# Patient Record
Sex: FEMALE | Race: BLACK OR AFRICAN AMERICAN
[De-identification: names, ages, dates, MRNs, and addresses within clinical notes are randomized per-mention and may not be internally consistent; named-entity substitution may affect disease eponyms.]

---

## 2018-10-16 ENCOUNTER — APPOINTMENT (OUTPATIENT)
Dept: OBGYN | Facility: CLINIC | Age: 38
End: 2018-10-16
Payer: MEDICAID

## 2018-10-16 ENCOUNTER — APPOINTMENT (OUTPATIENT)
Dept: OBGYN | Facility: CLINIC | Age: 38
End: 2018-10-16

## 2018-10-16 PROBLEM — Z00.00 ENCOUNTER FOR PREVENTIVE HEALTH EXAMINATION: Status: ACTIVE | Noted: 2018-10-16

## 2018-10-16 PROCEDURE — 99205 OFFICE O/P NEW HI 60 MIN: CPT | Mod: 25,TH

## 2018-10-16 PROCEDURE — 76817 TRANSVAGINAL US OBSTETRIC: CPT

## 2018-10-30 ENCOUNTER — APPOINTMENT (OUTPATIENT)
Dept: OBGYN | Facility: CLINIC | Age: 38
End: 2018-10-30
Payer: MEDICAID

## 2018-10-30 PROCEDURE — 76817 TRANSVAGINAL US OBSTETRIC: CPT

## 2018-11-13 ENCOUNTER — APPOINTMENT (OUTPATIENT)
Dept: ANTEPARTUM | Facility: CLINIC | Age: 38
End: 2018-11-13

## 2018-11-13 ENCOUNTER — OUTPATIENT (OUTPATIENT)
Dept: OUTPATIENT SERVICES | Facility: HOSPITAL | Age: 38
LOS: 1 days | Discharge: HOME | End: 2018-11-13

## 2018-11-13 ENCOUNTER — APPOINTMENT (OUTPATIENT)
Dept: OBGYN | Facility: CLINIC | Age: 38
End: 2018-11-13
Payer: MEDICAID

## 2018-11-13 ENCOUNTER — RESULT CHARGE (OUTPATIENT)
Age: 38
End: 2018-11-13

## 2018-11-13 VITALS
TEMPERATURE: 98.4 F | SYSTOLIC BLOOD PRESSURE: 122 MMHG | HEIGHT: 70 IN | WEIGHT: 270.19 LBS | DIASTOLIC BLOOD PRESSURE: 74 MMHG | HEART RATE: 83 BPM | BODY MASS INDEX: 38.68 KG/M2 | OXYGEN SATURATION: 100 %

## 2018-11-13 DIAGNOSIS — Z78.9 OTHER SPECIFIED HEALTH STATUS: ICD-10-CM

## 2018-11-13 DIAGNOSIS — Z34.90 ENCOUNTER FOR SUPERVISION OF NORMAL PREGNANCY, UNSPECIFIED, UNSPECIFIED TRIMESTER: ICD-10-CM

## 2018-11-13 DIAGNOSIS — Z80.42 FAMILY HISTORY OF MALIGNANT NEOPLASM OF PROSTATE: ICD-10-CM

## 2018-11-13 LAB
BILIRUB UR QL STRIP: NEGATIVE
CLARITY UR: CLEAR
COLLECTION METHOD: NORMAL
FETAL HEART RATE (BPM): 161
GLUCOSE UR-MCNC: NEGATIVE
HCG UR QL: 0.2 EU/DL
HGB UR QL STRIP.AUTO: NEGATIVE
KETONES UR-MCNC: NEGATIVE
LEUKOCYTE ESTERASE UR QL STRIP: NEGATIVE
NITRITE UR QL STRIP: NEGATIVE
OB COMMENTS: NORMAL
PH UR STRIP: 5
PROT UR STRIP-MCNC: NORMAL
SCHEDULED VISIT: YES
SP GR UR STRIP: 1.02
URINE ALBUMIN/PROTEIN: NORMAL
URINE GLUCOSE: NEGATIVE
URINE KETONES: NEGATIVE
WEEKS GESTATION: NORMAL

## 2018-11-13 PROCEDURE — 99214 OFFICE O/P EST MOD 30 MIN: CPT | Mod: TH

## 2018-11-20 ENCOUNTER — APPOINTMENT (OUTPATIENT)
Dept: ANTEPARTUM | Facility: CLINIC | Age: 38
End: 2018-11-20

## 2018-11-20 ENCOUNTER — OUTPATIENT (OUTPATIENT)
Dept: OUTPATIENT SERVICES | Facility: HOSPITAL | Age: 38
LOS: 1 days | Discharge: HOME | End: 2018-11-20

## 2018-12-09 ENCOUNTER — EMERGENCY (EMERGENCY)
Facility: HOSPITAL | Age: 38
LOS: 0 days | Discharge: HOME | End: 2018-12-09
Attending: EMERGENCY MEDICINE | Admitting: EMERGENCY MEDICINE

## 2018-12-09 VITALS
OXYGEN SATURATION: 100 % | HEART RATE: 89 BPM | RESPIRATION RATE: 18 BRPM | TEMPERATURE: 99 F | DIASTOLIC BLOOD PRESSURE: 63 MMHG | SYSTOLIC BLOOD PRESSURE: 117 MMHG

## 2018-12-09 VITALS — HEIGHT: 70 IN | WEIGHT: 229.94 LBS

## 2018-12-09 DIAGNOSIS — O09.522 SUPERVISION OF ELDERLY MULTIGRAVIDA, SECOND TRIMESTER: ICD-10-CM

## 2018-12-09 DIAGNOSIS — R51 HEADACHE: ICD-10-CM

## 2018-12-09 DIAGNOSIS — Z3A.15 15 WEEKS GESTATION OF PREGNANCY: ICD-10-CM

## 2018-12-09 RX ORDER — METOCLOPRAMIDE HCL 10 MG
10 TABLET ORAL ONCE
Qty: 0 | Refills: 0 | Status: COMPLETED | OUTPATIENT
Start: 2018-12-09 | End: 2018-12-09

## 2018-12-09 RX ORDER — ACETAMINOPHEN 500 MG
975 TABLET ORAL ONCE
Qty: 0 | Refills: 0 | Status: COMPLETED | OUTPATIENT
Start: 2018-12-09 | End: 2018-12-09

## 2018-12-09 RX ORDER — METOCLOPRAMIDE HCL 10 MG
1 TABLET ORAL
Qty: 9 | Refills: 0
Start: 2018-12-09 | End: 2018-12-11

## 2018-12-09 RX ADMIN — Medication 975 MILLIGRAM(S): at 16:03

## 2018-12-09 RX ADMIN — Medication 10 MILLIGRAM(S): at 16:03

## 2018-12-09 NOTE — ED PROVIDER NOTE - NSFOLLOWUPINSTRUCTIONS_ED_ALL_ED_FT
Follow up with your primary care doctor in 48 hours for re-evaluation and further treatment.  Follow up with ob/gyn in 48 hours for re-evaluation and further treatment.    Headache    A headache is pain or discomfort felt around the head or neck area. The specific cause of a headache may not be found as there are many types including tension headaches, migraine headaches, and cluster headaches. Watch your condition for any changes. Things you can do to manage your pain include taking over the counter and prescription medications as instructed by your health care provider, lying down in a dark quiet room, limiting stress, getting regular sleep, and refraining from alcohol and tobacco products.    SEEK IMMEDIATE MEDICAL CARE IF YOU EXPERIENCE THE FOLLOWING SYMPTOMS: fever, vomiting, stiff neck, loss of vision, problems with speech, muscle weakness, loss of balance, trouble walking, pass out, or confusion.

## 2018-12-09 NOTE — ED PROVIDER NOTE - PHYSICAL EXAMINATION
GEN: Well appearing, in no apparent distress.    HEAD:  Normocephalic, atraumatic.    EYES:  Clear conjunctivae without injection, drainage or discharge. EOM intact.     ENMT:  Moist MM.    NECK:  Supple, no masses. Normal ROM.    CARDIAC:  RRR, normal S1 and S2, no murmurs, rubs or gallops.    RESP:  Respiratory rate and effort appear normal; lungs are clear to auscultation bilaterally; no rhonchi, rales or wheezes.    ABDOMEN:  Soft, non-tender, non-distended, no masses. Normal BS throughout.    MUSCULOSKELETAL: No leg swelling, no calf tenderness. Patient able to ambulate without difficulty.  NEURO:  AAO x 3. Motor 5/5. Sensory intact. CN II – XII intact.     SKIN:  Normal skin color for age and race, well-perfused; warm and dry.

## 2018-12-09 NOTE — ED PROVIDER NOTE - CARE PROVIDERS DIRECT ADDRESSES
[email or web address]

## 2018-12-09 NOTE — ED ADULT NURSE NOTE - NSIMPLEMENTINTERV_GEN_ALL_ED
Implemented All Universal Safety Interventions:  [place] to call system. Call bell, personal items and telephone within reach. Instruct patient to call for assistance. Room bathroom lighting operational. Non-slip footwear when patient is off stretcher. Physically safe environment: no spills, clutter or unnecessary equipment. Stretcher in lowest position, wheels locked, appropriate side rails in place.

## 2018-12-09 NOTE — ED PROVIDER NOTE - CARE PROVIDER_API CALL
Sergey Sol), Obstetrics and Gynecology  [facility] [place] [address]  Phone: [phone]  Fax: [phone]

## 2018-12-09 NOTE — ED PROVIDER NOTE - ATTENDING CONTRIBUTION TO CARE
This is a 38yF  at 15wks presents for R temporal headache. She has had similar headaches since the start of this pregnancy, no vomiting, no visual changes, no fevers or neck stiffness.  Headache was similar to usual but recurrent (started 3d ago, lasted for 1d, improved 2d ago then restarted yesterday and has not resolved) and now lasting >24hr which is not typical for her.  Still able to tolerate PO.  Took tylenol without relief and wasn't sure what else she could take in the setting of pregnancy.  No VB, LOF, abd pain/cramping.  Has had US to confirm IUP thus far in her pregnancy.    VSS  CONSTITUTIONAL: well developed; well nourished; well appearing in no acute distress  HEAD: normocephalic; atraumatic  EYES: PERRL, no conjunctival injection, no scleral icterus  ENT: no nasal discharge; airway clear.  NECK: supple; non tender. + full passive ROM in all directions  CARD: S1, S2 normal; no murmurs, gallops, or rubs. Regular rate and rhythm  RESP: no wheezes, rales or rhonchi. Good air movement bilaterally without significant accessory muscle use  ABD: soft; non-distended; non-tender. No rebound, no guarding, no pulsatile abdominal mass  EXT: moving all extremities spontaneously, normal ROM. No clubbing, cyanosis or edema  SKIN: warm and dry, no lesions noted  NEURO: alert, oriented, CN II-XII grossly intact, motor and sensory grossly intact, speech nonslurred, no focal deficits. GCS 15  PSYCH: calm, cooperative, appropriate, good eye contact, logical thought process, no apparent danger to self or others    likely tension headache  minimal clinical concern for acute serious pathology, such as SAH, sinus thrombosis  too early in pregnancy to concern for preeclampsia and her BP is in normal range  will treat and reassess  anticipate d/c home with ob f/u

## 2018-12-09 NOTE — ED ADULT NURSE NOTE - CHPI ED NUR SYMPTOMS NEG
no blurred vision/no vomiting/no fever/no confusion/no change in level of consciousness/no loss of consciousness/no nausea/no numbness

## 2018-12-09 NOTE — ED PROVIDER NOTE - MEDICAL DECISION MAKING DETAILS
38yF  p/w R temporal headache. Well appearing, comfortable, nonfocal neuro exam.  No concern for emergent causes of headache, including SAH, meningitis, sinus thrombosis, preeclampsia. Pain improved w/ reglan and tylenol, ok for dc home with o/p f/u and return precautions.

## 2018-12-09 NOTE — ED PROVIDER NOTE - OBJECTIVE STATEMENT
37 yo female , 15 weeks pregnant presents for headache. Patient states she had tried tylenol without relief. Pain is band-like and like similar pain in the past. Patient denies fevers, chest pain, SOB, abdominal pain, nausea, vomiting, diarrhea, constipation, dizziness, weakness, changes in PO intake, changes in urine output, changes in mental status.

## 2018-12-09 NOTE — ED PROVIDER NOTE - NS ED ROS FT
Constitutional: No fevers/chills.    Head: No injury, (+) headache.    Eyes:  No visual changes, eye pain or discharge. No injury.    ENMT:  No hearing changes, pain, discharge or infections. No neck pain or stiffness. No loss ROM.    Cardiac:  No chest pain, SOB or edema. No chest pain with exertion.    Respiratory:  No cough or respiratory distress.     GI:  No nausea, vomiting, diarrhea or abdominal pain. No anorexia. No change in PO intake.    :  No frequency, urgency or burning. No change in urine output.    MS:  No leg pain, leg swelling, myalgia, muscle weakness, joint pain or back pain. No loss ROM.    Neuro:  No dizziness or weakness.  No LOC.    Skin:  No skin rash.

## 2018-12-13 ENCOUNTER — APPOINTMENT (OUTPATIENT)
Dept: OBGYN | Facility: CLINIC | Age: 38
End: 2018-12-13
Payer: MEDICAID

## 2018-12-13 PROCEDURE — 99214 OFFICE O/P EST MOD 30 MIN: CPT | Mod: TH

## 2019-01-14 ENCOUNTER — APPOINTMENT (OUTPATIENT)
Dept: OBGYN | Facility: CLINIC | Age: 39
End: 2019-01-14
Payer: MEDICAID

## 2019-01-14 PROCEDURE — 99214 OFFICE O/P EST MOD 30 MIN: CPT | Mod: TH

## 2019-01-16 ENCOUNTER — OUTPATIENT (OUTPATIENT)
Dept: OUTPATIENT SERVICES | Facility: HOSPITAL | Age: 39
LOS: 1 days | Discharge: HOME | End: 2019-01-16

## 2019-01-16 ENCOUNTER — APPOINTMENT (OUTPATIENT)
Dept: ANTEPARTUM | Facility: CLINIC | Age: 39
End: 2019-01-16

## 2019-01-18 DIAGNOSIS — O09.522 SUPERVISION OF ELDERLY MULTIGRAVIDA, SECOND TRIMESTER: ICD-10-CM

## 2019-01-18 DIAGNOSIS — Z3A.20 20 WEEKS GESTATION OF PREGNANCY: ICD-10-CM

## 2019-01-18 DIAGNOSIS — O26.872 CERVICAL SHORTENING, SECOND TRIMESTER: ICD-10-CM

## 2019-01-18 DIAGNOSIS — O35.8XX1 MATERNAL CARE FOR OTHER (SUSPECTED) FETAL ABNORMALITY AND DAMAGE, FETUS 1: ICD-10-CM

## 2019-02-01 ENCOUNTER — OUTPATIENT (OUTPATIENT)
Dept: OUTPATIENT SERVICES | Facility: HOSPITAL | Age: 39
LOS: 1 days | End: 2019-02-01
Payer: MEDICAID

## 2019-02-01 PROCEDURE — G9001: CPT

## 2019-02-12 ENCOUNTER — APPOINTMENT (OUTPATIENT)
Dept: OBGYN | Facility: CLINIC | Age: 39
End: 2019-02-12

## 2019-02-14 ENCOUNTER — APPOINTMENT (OUTPATIENT)
Dept: OBGYN | Facility: CLINIC | Age: 39
End: 2019-02-14
Payer: MEDICAID

## 2019-02-14 PROCEDURE — 99214 OFFICE O/P EST MOD 30 MIN: CPT | Mod: TH

## 2019-02-22 DIAGNOSIS — Z71.89 OTHER SPECIFIED COUNSELING: ICD-10-CM

## 2019-03-14 ENCOUNTER — APPOINTMENT (OUTPATIENT)
Dept: OBGYN | Facility: CLINIC | Age: 39
End: 2019-03-14
Payer: MEDICAID

## 2019-03-14 PROCEDURE — 99214 OFFICE O/P EST MOD 30 MIN: CPT | Mod: TH

## 2019-03-19 ENCOUNTER — APPOINTMENT (OUTPATIENT)
Dept: ANTEPARTUM | Facility: CLINIC | Age: 39
End: 2019-03-19

## 2019-03-19 ENCOUNTER — OUTPATIENT (OUTPATIENT)
Dept: OUTPATIENT SERVICES | Facility: HOSPITAL | Age: 39
LOS: 1 days | Discharge: HOME | End: 2019-03-19

## 2019-03-19 ENCOUNTER — RESULT CHARGE (OUTPATIENT)
Age: 39
End: 2019-03-19

## 2019-03-19 VITALS
HEIGHT: 69 IN | WEIGHT: 281 LBS | SYSTOLIC BLOOD PRESSURE: 111 MMHG | DIASTOLIC BLOOD PRESSURE: 64 MMHG | HEART RATE: 81 BPM | OXYGEN SATURATION: 99 % | BODY MASS INDEX: 41.62 KG/M2 | TEMPERATURE: 97.6 F

## 2019-03-19 DIAGNOSIS — O16.9 UNSPECIFIED MATERNAL HYPERTENSION, UNSPECIFIED TRIMESTER: ICD-10-CM

## 2019-03-19 DIAGNOSIS — Z82.49 FAMILY HISTORY OF ISCHEMIC HEART DISEASE AND OTHER DISEASES OF THE CIRCULATORY SYSTEM: ICD-10-CM

## 2019-03-19 LAB
BILIRUB UR QL STRIP: NEGATIVE
CLARITY UR: CLEAR
COLLECTION METHOD: NORMAL
FETAL HEART DESCRIPTION: NORMAL
FETAL HEART RATE (BPM): 141
FETAL MOVEMENT: PRESENT
GLUCOSE UR-MCNC: NEGATIVE
HCG UR QL: 0.2 EU/DL
HGB UR QL STRIP.AUTO: NEGATIVE
KETONES UR-MCNC: NEGATIVE
LEUKOCYTE ESTERASE UR QL STRIP: NORMAL
NITRITE UR QL STRIP: NEGATIVE
PH UR STRIP: 6
PROT UR STRIP-MCNC: NEGATIVE
SP GR UR STRIP: 1.02
URINE ALBUMIN/PROTEIN: NEGATIVE
URINE GLUCOSE: NEGATIVE
URINE KETONES: NEGATIVE

## 2019-03-19 RX ORDER — PNV/FERROUS SULFATE/FOLIC ACID 27-<0.5MG
TABLET ORAL
Refills: 0 | Status: ACTIVE | COMMUNITY

## 2019-03-19 NOTE — END OF VISIT
[FreeTextEntry3] : [facility] Staff\par \par I saw and evaluated Ms. Molina with Dr. Hercules and I agree with the documentation above.  Ms. Molina is a 39yo  at 29w2d GA, BAMBI 18 by LMP, referred by Dr. Sol for elevated blood pressures in his office.  Ms. Molina has no symptoms of headaches, spots in front of her eyes, or RUQ pain.  At today's visit the blood pressure was normal (111/54).\par \par We dsicussed the concerns with preeclampsia.  Preeclampsia can be associated with serious maternal and/or fetal sequelae (eg, abruptio placentae; liver hematoma or rupture; liver failure; heart failure; disseminated intravascular coagulation; stroke; need for mechanical ventilation, invasive hemodynamic monitoring, transfusion, or dialysis).  Fetal risks include but are not limited to stillbirth, IUGR, and  delivery. \par \par However at this point I do not think that Ms. Molina has preeclampsia.  She will monitor her blood pressures at home, collect a 24 hour urine protein, and check PIH labs.  She will return to clinic in two weeks.  If her testing is normal she does not need to return to clinic unless something should develop.\par \par Prenatal care is with Dr. Sol. \par \par Preeclampsia, fetal movement, and labor precautions discussed.  \par \par Andres Goodson MD\par \par

## 2019-03-19 NOTE — SURGICAL HISTORY
[Fibroids] : no fibroids [Abn Paps] : no abnormal pap smears [Breast Disease] : no breast disease [STI's] : no STI's [Infertility] : no infertility [Cysts] : no cysts [OC Use] : no OC use

## 2019-03-19 NOTE — PHYSICAL EXAM
[FreeTextEntry1] : Gen: AAOx3, NAD\par Heart: RRR, NL S1/S2\par Lungs: CTAB\par Abd: soft, obese, nontender, no palpable ctx\par Ext: no calf pain or swelling\par Neuro: DTRs 2+\par Psych:  Awake, alert, oriented\par

## 2019-03-19 NOTE — PAST MEDICAL HISTORY
[HIV Infection] : no HIV [Exposure To Gonorrhea] : no gonorrhea [Syphilis] : no syphilis [Chlamydial Infections] : no chlamydia [Herpes Simplex] : no genital herpes [Hepatitis, B Virus] : no Hepatitis B [Human Papilloma Virus Infection] : no genital warts [Hepatitis, C Virus] : no Hepatitis C [Trichomoniasis] : no trichomoniasis

## 2019-03-19 NOTE — ACTIVE PROBLEMS
[Diabetes Mellitus] : no diabetes mellitus [Hypertension] : no hypertension [Heart Disease] : no heart disease [Autoimmune Disease] : no autoimmune disease [Neurologic Disorder] : no neurologic disorder, no epilepsy [Renal Disease] : no kidney disease, no UTI [Psychiatric Disorders] : no psychiatric disorders [Depression] : no depression, no post partum depression [Hepatic Disorder] : no hepatitis, no liver disease [Thrombophlebitis] : no varicosities, no phlebitis [Trauma] : no trauma/violence [Thyroid Disorder] : no thyroid dysfunction [Blood Transfusion (___ Ml)] : no history of blood transfusion

## 2019-03-19 NOTE — DISCUSSION/SUMMARY
[FreeTextEntry1] : 37yo  at 29w2d GA, BAMBI 18 by LMP, AMA, isolated elevated BP at prenatal visit, r/o gHTN vs preeclampsia,\par \par AMA\par -quad screen negative\par -per patient  imaging wnl\par \par Isolated elevated BP, r/o gHTN vs preeclampsia\par -normal BP today 111/64, no protein on udip, no history, asymptomatic\par -script for BP cuff, daily BP monitoring and logging\par -script for 24hr urine and jug for collection\par -script for preeclamptic labwork\par -RTC in 2wks to review BP log and results of labs\par \par Recommend routine prenatal care with BP and udip at each prenatal visit.\par \par

## 2019-03-19 NOTE — HISTORY OF PRESENT ILLNESS
[FreeTextEntry1] : Pt is a 39yo  at 29w2d GA by LMP previously consulted for AMA now referred for elevated BP at prenatal visit.  Pt last prenatal visit on 3/14, /80 and repeat was 140/78.  Pt denies issues with blood pressure in previous pregnancies or otherwise.  Pt denies headaches, vision changes, RUQ/epigastric pain, weight gain, or increased swelling of extremities.  Pt has gained 19lbs since first prenatal visit.  Pt has brother with h/o HTN, no other significant family history.   [Patient travelled to an area with active Zika Virus transmission during pregnancy or 8 weeks before getting pregnant] : Patient stated she did not travel to an area with active Zika virus transmission during pregnancy or 8 weeks before getting pregnant [Patient had sex without a condom with a man who traveled to, or reside in, an area with active Zika Virus transmission during pregnancy] : Patient did not have sex without a condom with a man who traveled to, or reside in, an area with active Zika Virus transmission during pregnancy

## 2019-03-28 ENCOUNTER — APPOINTMENT (OUTPATIENT)
Dept: OBGYN | Facility: CLINIC | Age: 39
End: 2019-03-28

## 2019-04-02 ENCOUNTER — RESULT CHARGE (OUTPATIENT)
Age: 39
End: 2019-04-02

## 2019-04-02 ENCOUNTER — OUTPATIENT (OUTPATIENT)
Dept: OUTPATIENT SERVICES | Facility: HOSPITAL | Age: 39
LOS: 1 days | Discharge: HOME | End: 2019-04-02

## 2019-04-02 ENCOUNTER — APPOINTMENT (OUTPATIENT)
Dept: ANTEPARTUM | Facility: CLINIC | Age: 39
End: 2019-04-02

## 2019-04-02 VITALS
BODY MASS INDEX: 41.94 KG/M2 | HEART RATE: 91 BPM | OXYGEN SATURATION: 90 % | SYSTOLIC BLOOD PRESSURE: 113 MMHG | WEIGHT: 284 LBS | DIASTOLIC BLOOD PRESSURE: 66 MMHG | TEMPERATURE: 98.1 F

## 2019-04-02 DIAGNOSIS — Z3A.31 31 WEEKS GESTATION OF PREGNANCY: ICD-10-CM

## 2019-04-02 DIAGNOSIS — O09.93 SUPERVISION OF HIGH RISK PREGNANCY, UNSPECIFIED, THIRD TRIMESTER: ICD-10-CM

## 2019-04-02 DIAGNOSIS — O16.3 UNSPECIFIED MATERNAL HYPERTENSION, THIRD TRIMESTER: ICD-10-CM

## 2019-04-02 LAB
BILIRUB UR QL STRIP: NEGATIVE
CLARITY UR: CLEAR
COLLECTION METHOD: NORMAL
GLUCOSE UR-MCNC: NEGATIVE
HCG UR QL: 0.2 EU/DL
HGB UR QL STRIP.AUTO: NEGATIVE
KETONES UR-MCNC: NEGATIVE
LEUKOCYTE ESTERASE UR QL STRIP: NEGATIVE
NITRITE UR QL STRIP: NEGATIVE
PH UR STRIP: 7
PROT UR STRIP-MCNC: NORMAL
SP GR UR STRIP: 1.01

## 2019-04-11 ENCOUNTER — APPOINTMENT (OUTPATIENT)
Dept: OBGYN | Facility: CLINIC | Age: 39
End: 2019-04-11
Payer: MEDICAID

## 2019-04-11 PROCEDURE — 99214 OFFICE O/P EST MOD 30 MIN: CPT | Mod: TH

## 2019-04-16 ENCOUNTER — APPOINTMENT (OUTPATIENT)
Dept: OBGYN | Facility: CLINIC | Age: 39
End: 2019-04-16
Payer: MEDICAID

## 2019-04-16 ENCOUNTER — RESULT CHARGE (OUTPATIENT)
Age: 39
End: 2019-04-16

## 2019-04-16 ENCOUNTER — OUTPATIENT (OUTPATIENT)
Dept: OUTPATIENT SERVICES | Facility: HOSPITAL | Age: 39
LOS: 1 days | Discharge: HOME | End: 2019-04-16

## 2019-04-16 ENCOUNTER — APPOINTMENT (OUTPATIENT)
Dept: ANTEPARTUM | Facility: CLINIC | Age: 39
End: 2019-04-16

## 2019-04-16 VITALS
SYSTOLIC BLOOD PRESSURE: 116 MMHG | BODY MASS INDEX: 42.09 KG/M2 | HEART RATE: 95 BPM | TEMPERATURE: 98.1 F | OXYGEN SATURATION: 97 % | WEIGHT: 285 LBS | DIASTOLIC BLOOD PRESSURE: 67 MMHG

## 2019-04-16 LAB
BILIRUB UR QL STRIP: NEGATIVE
CLARITY UR: CLEAR
COLLECTION METHOD: NORMAL
FETAL HEART DESCRIPTION: NORMAL
FETAL HEART RATE (BPM): 124
FETAL HEART RATE (BPM): 142
FETAL MOVEMENT: PRESENT
FETAL MOVEMENT: PRESENT
GLUCOSE UR-MCNC: NEGATIVE
HCG UR QL: 0.2 EU/DL
HGB UR QL STRIP.AUTO: NEGATIVE
KETONES UR-MCNC: NEGATIVE
LEUKOCYTE ESTERASE UR QL STRIP: NORMAL
NITRITE UR QL STRIP: NEGATIVE
PH UR STRIP: 6
PROT UR STRIP-MCNC: 1
SP GR UR STRIP: 1.02
URINE ALBUMIN/PROTEIN: 1
URINE ALBUMIN/PROTEIN: NORMAL
URINE GLUCOSE: NEGATIVE
URINE GLUCOSE: NEGATIVE
URINE KETONES: NEGATIVE
URINE KETONES: NEGATIVE

## 2019-04-16 PROCEDURE — 99214 OFFICE O/P EST MOD 30 MIN: CPT | Mod: TH

## 2019-04-30 ENCOUNTER — APPOINTMENT (OUTPATIENT)
Dept: ANTEPARTUM | Facility: CLINIC | Age: 39
End: 2019-04-30

## 2019-04-30 ENCOUNTER — APPOINTMENT (OUTPATIENT)
Dept: OBGYN | Facility: CLINIC | Age: 39
End: 2019-04-30
Payer: MEDICAID

## 2019-04-30 ENCOUNTER — OUTPATIENT (OUTPATIENT)
Dept: OUTPATIENT SERVICES | Facility: HOSPITAL | Age: 39
LOS: 1 days | Discharge: HOME | End: 2019-04-30

## 2019-04-30 ENCOUNTER — RESULT CHARGE (OUTPATIENT)
Age: 39
End: 2019-04-30

## 2019-04-30 VITALS
TEMPERATURE: 98.2 F | BODY MASS INDEX: 41.94 KG/M2 | WEIGHT: 284 LBS | SYSTOLIC BLOOD PRESSURE: 103 MMHG | DIASTOLIC BLOOD PRESSURE: 64 MMHG | OXYGEN SATURATION: 100 % | HEART RATE: 100 BPM

## 2019-04-30 LAB
BILIRUB UR QL STRIP: NEGATIVE
CLARITY UR: CLEAR
COLLECTION METHOD: NORMAL
FETAL HEART DESCRIPTION: NORMAL
FETAL HEART RATE (BPM): 144
FETAL MOVEMENT: PRESENT
GLUCOSE UR-MCNC: NEGATIVE
HCG UR QL: 0.2 EU/DL
HGB UR QL STRIP.AUTO: NEGATIVE
KETONES UR-MCNC: NEGATIVE
LEUKOCYTE ESTERASE UR QL STRIP: NEGATIVE
NITRITE UR QL STRIP: NEGATIVE
PH UR STRIP: 6
PROT UR STRIP-MCNC: 1
SP GR UR STRIP: 1.03
URINE ALBUMIN/PROTEIN: 1
URINE GLUCOSE: NEGATIVE
URINE KETONES: NEGATIVE

## 2019-04-30 PROCEDURE — 99214 OFFICE O/P EST MOD 30 MIN: CPT | Mod: TH

## 2019-04-30 NOTE — DATA REVIEWED
[FreeTextEntry1] : baseline preeclamptic labwork\par 3/21/2019\par 6.4>10.5/32.5<200\par 133/4.1/102/21/10/0.57<92\par uric acid 2.7\par \par 24hr urine 144

## 2019-04-30 NOTE — PHYSICAL EXAM
[Awake] : awake [Alert] : alert [Soft] : soft [Oriented x3] : oriented to person, place, and time [RRR, No Murmurs] : RRR, no murmurs [CTAB] : CTAB [Acute Distress] : no acute distress [Tender] : non tender [Distended] : not distended [Depressed Mood] : not depressed [Flat Affect] : affect not flat

## 2019-04-30 NOTE — END OF VISIT
[FreeTextEntry3] : [facility] Staff\par \par \par I saw and evaluated Ms. Molina with Dr. Hercules and I agree with the documentation above.   I modified the note above (if indicated) and agree with its contents in the present form.  No blood pressure log today, however blood pressures in the office have been within normal limits.\par \par Prenatal care is with Dr. Sol\par \par Repeat  delivery after 39 weeks gestation. According to my calculations the BAMBI is 2019, please confirm this.\par \par Preeclampsia,fetal movement, and labor precautions discussed\par \par Estimated fetal weight in two weeks\par \par RTC 2 weeks\par \par Andres Goodson MD\par \par \par \par \par \par

## 2019-04-30 NOTE — PHYSICAL EXAM
[Acute Distress] : no acute distress [Tender] : non tender [Distended] : not distended [Depressed Mood] : not depressed [Flat Affect] : affect not flat

## 2019-04-30 NOTE — DISCUSSION/SUMMARY
[FreeTextEntry1] : 37yo  at 33w2d GA, BAMBI 18 by LMP, AMA, transient HTN during pregnancy,\par \par AMA\par -quad screen negative\par -per patient  imaging wnl\par \par Transient HTN\par -BP log kept previously all wnl (scanned in), range 120-137/70-86\par -BP today 116/67, udip 1+ protein\par -PELs scanned in wnl, 24hr urine not scanned will contact lab\par \par pregnancy\par -routine prenatal care with PMD\par -growth scan today EFW 2489g 64%ile, vtx, ant placenta, MVP 66mm, BPP 8/8\par \par RTC 2wks for BP log review\par F/u with PMD as scheduled\par Growth scan in 4wks\par \par

## 2019-04-30 NOTE — END OF VISIT
[FreeTextEntry3] : [facility] Staff\par \par I saw and evaluated Ms. Molina with Dr. Hercules.  Ms. Molina has no complaints.  She reports positive fetal movement.  I would like her to keep a blood pressure log.  Preeclampsia, fetal movement, and labor precautions discussed.\par \par - Prenatal care is with Dr. Sol\par - RTC in around two weeks, and follow up ultrasound in around four weeks.\par \par Andres Goodson MD

## 2019-04-30 NOTE — DISCUSSION/SUMMARY
[FreeTextEntry1] : 37yo  at 35w3d GA, BAMBI 18 by LMP, AMA, transient HTN during pregnancy,\par \par AMA\par -quad screen negative\par -per patient  imaging wnl\par \par Transient HTN\par -no BP log today, non elevated at visits\par -asymptomatic\par -BP today 103/64, udip 1+ protein\par -PELs scanned in wnl, 24hr urine not scanned still need results, will contact PMD to fax over\par \par pregnancy\par -routine prenatal care with PMD\par -growth scan today EFW 2489g 64%ile, vtx, ant placenta, MVP 66mm, BPP \par -schedule for repeat c/s at 39wks --> week of \par \par \par Prenatal care is with Dr. Sol, next appointment today\par RTC 2 weeks\par Growth scan \par \par \par

## 2019-05-01 ENCOUNTER — OUTPATIENT (OUTPATIENT)
Dept: OUTPATIENT SERVICES | Facility: HOSPITAL | Age: 39
LOS: 1 days | End: 2019-05-01

## 2019-05-07 ENCOUNTER — APPOINTMENT (OUTPATIENT)
Dept: OBGYN | Facility: CLINIC | Age: 39
End: 2019-05-07

## 2019-05-13 ENCOUNTER — APPOINTMENT (OUTPATIENT)
Dept: OBGYN | Facility: CLINIC | Age: 39
End: 2019-05-13
Payer: MEDICAID

## 2019-05-13 PROCEDURE — 99214 OFFICE O/P EST MOD 30 MIN: CPT | Mod: TH

## 2019-05-14 ENCOUNTER — OUTPATIENT (OUTPATIENT)
Dept: OUTPATIENT SERVICES | Facility: HOSPITAL | Age: 39
LOS: 1 days | Discharge: HOME | End: 2019-05-14

## 2019-05-14 ENCOUNTER — APPOINTMENT (OUTPATIENT)
Dept: ANTEPARTUM | Facility: CLINIC | Age: 39
End: 2019-05-14
Payer: MEDICAID

## 2019-05-14 PROCEDURE — 76816 OB US FOLLOW-UP PER FETUS: CPT | Mod: 26

## 2019-05-20 ENCOUNTER — APPOINTMENT (OUTPATIENT)
Dept: OBGYN | Facility: CLINIC | Age: 39
End: 2019-05-20
Payer: MEDICAID

## 2019-05-20 PROCEDURE — 99214 OFFICE O/P EST MOD 30 MIN: CPT | Mod: TH

## 2019-05-28 ENCOUNTER — OUTPATIENT (OUTPATIENT)
Dept: OUTPATIENT SERVICES | Facility: HOSPITAL | Age: 39
LOS: 1 days | Discharge: HOME | End: 2019-05-28

## 2019-05-28 DIAGNOSIS — Z01.818 ENCOUNTER FOR OTHER PREPROCEDURAL EXAMINATION: ICD-10-CM

## 2019-05-28 DIAGNOSIS — O34.211 MATERNAL CARE FOR LOW TRANSVERSE SCAR FROM PREVIOUS CESAREAN DELIVERY: ICD-10-CM

## 2019-05-28 LAB
ALBUMIN SERPL ELPH-MCNC: 3.6 G/DL — SIGNIFICANT CHANGE UP (ref 3.5–5.2)
ALP SERPL-CCNC: 82 U/L — SIGNIFICANT CHANGE UP (ref 30–115)
ALT FLD-CCNC: 6 U/L — SIGNIFICANT CHANGE UP (ref 0–41)
ANION GAP SERPL CALC-SCNC: 12 MMOL/L — SIGNIFICANT CHANGE UP (ref 7–14)
APPEARANCE UR: ABNORMAL
APTT BLD: 27.1 SEC — SIGNIFICANT CHANGE UP (ref 27–39.2)
AST SERPL-CCNC: 12 U/L — SIGNIFICANT CHANGE UP (ref 0–41)
BASOPHILS # BLD AUTO: 0.02 K/UL — SIGNIFICANT CHANGE UP (ref 0–0.2)
BASOPHILS NFR BLD AUTO: 0.3 % — SIGNIFICANT CHANGE UP (ref 0–1)
BILIRUB SERPL-MCNC: 0.2 MG/DL — SIGNIFICANT CHANGE UP (ref 0.2–1.2)
BILIRUB UR-MCNC: NEGATIVE — SIGNIFICANT CHANGE UP
BLD GP AB SCN SERPL QL: SIGNIFICANT CHANGE UP
BUN SERPL-MCNC: 11 MG/DL — SIGNIFICANT CHANGE UP (ref 10–20)
CALCIUM SERPL-MCNC: 8.8 MG/DL — SIGNIFICANT CHANGE UP (ref 8.5–10.1)
CHLORIDE SERPL-SCNC: 106 MMOL/L — SIGNIFICANT CHANGE UP (ref 98–110)
CO2 SERPL-SCNC: 20 MMOL/L — SIGNIFICANT CHANGE UP (ref 17–32)
COLOR SPEC: SIGNIFICANT CHANGE UP
CREAT SERPL-MCNC: 0.7 MG/DL — SIGNIFICANT CHANGE UP (ref 0.7–1.5)
DIFF PNL FLD: NEGATIVE — SIGNIFICANT CHANGE UP
EOSINOPHIL # BLD AUTO: 0.11 K/UL — SIGNIFICANT CHANGE UP (ref 0–0.7)
EOSINOPHIL NFR BLD AUTO: 1.6 % — SIGNIFICANT CHANGE UP (ref 0–8)
EPI CELLS # UR: ABNORMAL /HPF
GLUCOSE SERPL-MCNC: 76 MG/DL — SIGNIFICANT CHANGE UP (ref 70–99)
GLUCOSE UR QL: NEGATIVE — SIGNIFICANT CHANGE UP
HCT VFR BLD CALC: 33.8 % — LOW (ref 37–47)
HGB BLD-MCNC: 11.5 G/DL — LOW (ref 12–16)
IMM GRANULOCYTES NFR BLD AUTO: 0.4 % — HIGH (ref 0.1–0.3)
INR BLD: 0.98 RATIO — SIGNIFICANT CHANGE UP (ref 0.65–1.3)
KETONES UR-MCNC: ABNORMAL
LEUKOCYTE ESTERASE UR-ACNC: NEGATIVE — SIGNIFICANT CHANGE UP
LYMPHOCYTES # BLD AUTO: 1.14 K/UL — LOW (ref 1.2–3.4)
LYMPHOCYTES # BLD AUTO: 16.2 % — LOW (ref 20.5–51.1)
MCHC RBC-ENTMCNC: 28.1 PG — SIGNIFICANT CHANGE UP (ref 27–31)
MCHC RBC-ENTMCNC: 34 G/DL — SIGNIFICANT CHANGE UP (ref 32–37)
MCV RBC AUTO: 82.6 FL — SIGNIFICANT CHANGE UP (ref 81–99)
MONOCYTES # BLD AUTO: 0.7 K/UL — HIGH (ref 0.1–0.6)
MONOCYTES NFR BLD AUTO: 10 % — HIGH (ref 1.7–9.3)
NEUTROPHILS # BLD AUTO: 5.03 K/UL — SIGNIFICANT CHANGE UP (ref 1.4–6.5)
NEUTROPHILS NFR BLD AUTO: 71.5 % — SIGNIFICANT CHANGE UP (ref 42.2–75.2)
NITRITE UR-MCNC: NEGATIVE — SIGNIFICANT CHANGE UP
NRBC # BLD: 0 /100 WBCS — SIGNIFICANT CHANGE UP (ref 0–0)
PH UR: 6.5 — SIGNIFICANT CHANGE UP (ref 5–8)
PLATELET # BLD AUTO: 163 K/UL — SIGNIFICANT CHANGE UP (ref 130–400)
POTASSIUM SERPL-MCNC: 3.7 MMOL/L — SIGNIFICANT CHANGE UP (ref 3.5–5)
POTASSIUM SERPL-SCNC: 3.7 MMOL/L — SIGNIFICANT CHANGE UP (ref 3.5–5)
PROT SERPL-MCNC: 6.4 G/DL — SIGNIFICANT CHANGE UP (ref 6–8)
PROT UR-MCNC: 30
PROTHROM AB SERPL-ACNC: 11.3 SEC — SIGNIFICANT CHANGE UP (ref 9.95–12.87)
RBC # BLD: 4.09 M/UL — LOW (ref 4.2–5.4)
RBC # FLD: 15.6 % — HIGH (ref 11.5–14.5)
SODIUM SERPL-SCNC: 138 MMOL/L — SIGNIFICANT CHANGE UP (ref 135–146)
SP GR SPEC: >=1.03 — SIGNIFICANT CHANGE UP (ref 1.01–1.03)
TYPE + AB SCN PNL BLD: SIGNIFICANT CHANGE UP
UROBILINOGEN FLD QL: 1 (ref 0.2–0.2)
WBC # BLD: 7.03 K/UL — SIGNIFICANT CHANGE UP (ref 4.8–10.8)
WBC # FLD AUTO: 7.03 K/UL — SIGNIFICANT CHANGE UP (ref 4.8–10.8)
WBC UR QL: SIGNIFICANT CHANGE UP /HPF

## 2019-05-29 ENCOUNTER — INPATIENT (INPATIENT)
Facility: HOSPITAL | Age: 39
LOS: 2 days | Discharge: HOME | End: 2019-06-01
Attending: OBSTETRICS & GYNECOLOGY | Admitting: OBSTETRICS & GYNECOLOGY
Payer: MEDICAID

## 2019-05-29 VITALS — SYSTOLIC BLOOD PRESSURE: 116 MMHG | DIASTOLIC BLOOD PRESSURE: 74 MMHG | HEART RATE: 82 BPM

## 2019-05-29 DIAGNOSIS — Z98.891 HISTORY OF UTERINE SCAR FROM PREVIOUS SURGERY: Chronic | ICD-10-CM

## 2019-05-29 DIAGNOSIS — Z98.890 OTHER SPECIFIED POSTPROCEDURAL STATES: Chronic | ICD-10-CM

## 2019-05-29 LAB
ABO RH CONFIRMATION: SIGNIFICANT CHANGE UP
AMPHET UR-MCNC: NEGATIVE — SIGNIFICANT CHANGE UP
ANION GAP SERPL CALC-SCNC: 11 MMOL/L — SIGNIFICANT CHANGE UP (ref 7–14)
APPEARANCE UR: ABNORMAL
BACTERIA # UR AUTO: ABNORMAL /HPF
BARBITURATES UR SCN-MCNC: NEGATIVE — SIGNIFICANT CHANGE UP
BENZODIAZ UR-MCNC: NEGATIVE — SIGNIFICANT CHANGE UP
BILIRUB UR-MCNC: NEGATIVE — SIGNIFICANT CHANGE UP
BUN SERPL-MCNC: 8 MG/DL — LOW (ref 10–20)
BUPRENORPHINE SCREEN, URINE RESULT: NEGATIVE — SIGNIFICANT CHANGE UP
CALCIUM SERPL-MCNC: 8.2 MG/DL — LOW (ref 8.5–10.1)
CHLORIDE SERPL-SCNC: 102 MMOL/L — SIGNIFICANT CHANGE UP (ref 98–110)
CO2 SERPL-SCNC: 23 MMOL/L — SIGNIFICANT CHANGE UP (ref 17–32)
COCAINE METAB.OTHER UR-MCNC: NEGATIVE — SIGNIFICANT CHANGE UP
COLOR SPEC: YELLOW — SIGNIFICANT CHANGE UP
COMMENT - URINE: SIGNIFICANT CHANGE UP
CREAT ?TM UR-MCNC: 244 MG/DL — SIGNIFICANT CHANGE UP
CREAT SERPL-MCNC: 0.6 MG/DL — LOW (ref 0.7–1.5)
DIFF PNL FLD: NEGATIVE — SIGNIFICANT CHANGE UP
EPI CELLS # UR: ABNORMAL /HPF
GLUCOSE SERPL-MCNC: 92 MG/DL — SIGNIFICANT CHANGE UP (ref 70–99)
GLUCOSE UR QL: NEGATIVE — SIGNIFICANT CHANGE UP
HCT VFR BLD CALC: 33 % — LOW (ref 37–47)
HGB BLD-MCNC: 10.7 G/DL — LOW (ref 12–16)
HIV 1 & 2 AB SERPL IA.RAPID: SIGNIFICANT CHANGE UP
KETONES UR-MCNC: NEGATIVE — SIGNIFICANT CHANGE UP
L&D DRUG SCREEN, URINE: SIGNIFICANT CHANGE UP
LEUKOCYTE ESTERASE UR-ACNC: ABNORMAL
MCHC RBC-ENTMCNC: 27.4 PG — SIGNIFICANT CHANGE UP (ref 27–31)
MCHC RBC-ENTMCNC: 32.4 G/DL — SIGNIFICANT CHANGE UP (ref 32–37)
MCV RBC AUTO: 84.6 FL — SIGNIFICANT CHANGE UP (ref 81–99)
METHADONE UR-MCNC: NEGATIVE — SIGNIFICANT CHANGE UP
NITRITE UR-MCNC: NEGATIVE — SIGNIFICANT CHANGE UP
NRBC # BLD: 0 /100 WBCS — SIGNIFICANT CHANGE UP (ref 0–0)
OPIATES UR-MCNC: NEGATIVE — SIGNIFICANT CHANGE UP
OXYCODONE UR-MCNC: NEGATIVE — SIGNIFICANT CHANGE UP
PCP UR-MCNC: NEGATIVE — SIGNIFICANT CHANGE UP
PH UR: 6.5 — SIGNIFICANT CHANGE UP (ref 5–8)
PLATELET # BLD AUTO: 142 K/UL — SIGNIFICANT CHANGE UP (ref 130–400)
POTASSIUM SERPL-MCNC: 3.7 MMOL/L — SIGNIFICANT CHANGE UP (ref 3.5–5)
POTASSIUM SERPL-SCNC: 3.7 MMOL/L — SIGNIFICANT CHANGE UP (ref 3.5–5)
PROPOXYPHENE QUALITATIVE URINE RESULT: NEGATIVE — SIGNIFICANT CHANGE UP
PROT UR-MCNC: 30
RBC # BLD: 3.9 M/UL — LOW (ref 4.2–5.4)
RBC # FLD: 15.4 % — HIGH (ref 11.5–14.5)
SODIUM SERPL-SCNC: 136 MMOL/L — SIGNIFICANT CHANGE UP (ref 135–146)
SODIUM UR-SCNC: 130 MMOL/L — SIGNIFICANT CHANGE UP
SP GR SPEC: >=1.03 — SIGNIFICANT CHANGE UP (ref 1.01–1.03)
T PALLIDUM AB TITR SER: NEGATIVE — SIGNIFICANT CHANGE UP
UROBILINOGEN FLD QL: 1 (ref 0.2–0.2)
WBC # BLD: 8.18 K/UL — SIGNIFICANT CHANGE UP (ref 4.8–10.8)
WBC # FLD AUTO: 8.18 K/UL — SIGNIFICANT CHANGE UP (ref 4.8–10.8)
WBC UR QL: SIGNIFICANT CHANGE UP /HPF

## 2019-05-29 PROCEDURE — 59514 CESAREAN DELIVERY ONLY: CPT | Mod: U9

## 2019-05-29 RX ORDER — SIMETHICONE 80 MG/1
80 TABLET, CHEWABLE ORAL EVERY 6 HOURS
Refills: 0 | Status: DISCONTINUED | OUTPATIENT
Start: 2019-05-29 | End: 2019-06-01

## 2019-05-29 RX ORDER — GLYCERIN ADULT
1 SUPPOSITORY, RECTAL RECTAL AT BEDTIME
Refills: 0 | Status: DISCONTINUED | OUTPATIENT
Start: 2019-05-30 | End: 2019-06-01

## 2019-05-29 RX ORDER — LANOLIN
1 OINTMENT (GRAM) TOPICAL EVERY 6 HOURS
Refills: 0 | Status: DISCONTINUED | OUTPATIENT
Start: 2019-05-30 | End: 2019-06-01

## 2019-05-29 RX ORDER — OXYTOCIN 10 UNIT/ML
333.33 VIAL (ML) INJECTION
Qty: 20 | Refills: 0 | Status: DISCONTINUED | OUTPATIENT
Start: 2019-05-30 | End: 2019-06-01

## 2019-05-29 RX ORDER — ONDANSETRON 8 MG/1
4 TABLET, FILM COATED ORAL EVERY 6 HOURS
Refills: 0 | Status: DISCONTINUED | OUTPATIENT
Start: 2019-05-29 | End: 2019-06-01

## 2019-05-29 RX ORDER — SODIUM CHLORIDE 9 MG/ML
1000 INJECTION, SOLUTION INTRAVENOUS
Refills: 0 | Status: DISCONTINUED | OUTPATIENT
Start: 2019-05-29 | End: 2019-05-30

## 2019-05-29 RX ORDER — SODIUM CHLORIDE 9 MG/ML
1000 INJECTION, SOLUTION INTRAVENOUS ONCE
Refills: 0 | Status: COMPLETED | OUTPATIENT
Start: 2019-05-29 | End: 2019-05-29

## 2019-05-29 RX ORDER — CEFAZOLIN SODIUM 1 G
2000 VIAL (EA) INJECTION ONCE
Refills: 0 | Status: COMPLETED | OUTPATIENT
Start: 2019-05-29 | End: 2019-05-29

## 2019-05-29 RX ORDER — OXYCODONE HYDROCHLORIDE 5 MG/1
5 TABLET ORAL
Refills: 0 | Status: DISCONTINUED | OUTPATIENT
Start: 2019-05-30 | End: 2019-06-01

## 2019-05-29 RX ORDER — MORPHINE SULFATE 50 MG/1
0.2 CAPSULE, EXTENDED RELEASE ORAL ONCE
Refills: 0 | Status: DISCONTINUED | OUTPATIENT
Start: 2019-05-29 | End: 2019-06-01

## 2019-05-29 RX ORDER — OXYTOCIN 10 UNIT/ML
333.33 VIAL (ML) INJECTION
Qty: 20 | Refills: 0 | Status: DISCONTINUED | OUTPATIENT
Start: 2019-05-29 | End: 2019-06-01

## 2019-05-29 RX ORDER — BUTORPHANOL TARTRATE 2 MG/ML
2 INJECTION, SOLUTION INTRAMUSCULAR; INTRAVENOUS ONCE
Refills: 0 | Status: DISCONTINUED | OUTPATIENT
Start: 2019-05-29 | End: 2019-06-01

## 2019-05-29 RX ORDER — ACETAMINOPHEN 500 MG
975 TABLET ORAL EVERY 6 HOURS
Refills: 0 | Status: DISCONTINUED | OUTPATIENT
Start: 2019-05-30 | End: 2019-06-01

## 2019-05-29 RX ORDER — DOCUSATE SODIUM 100 MG
100 CAPSULE ORAL
Refills: 0 | Status: DISCONTINUED | OUTPATIENT
Start: 2019-05-29 | End: 2019-06-01

## 2019-05-29 RX ORDER — KETOROLAC TROMETHAMINE 30 MG/ML
30 SYRINGE (ML) INJECTION EVERY 6 HOURS
Refills: 0 | Status: DISCONTINUED | OUTPATIENT
Start: 2019-05-30 | End: 2019-05-31

## 2019-05-29 RX ORDER — DIPHENHYDRAMINE HCL 50 MG
25 CAPSULE ORAL EVERY 6 HOURS
Refills: 0 | Status: DISCONTINUED | OUTPATIENT
Start: 2019-05-29 | End: 2019-06-01

## 2019-05-29 RX ORDER — IBUPROFEN 200 MG
600 TABLET ORAL EVERY 6 HOURS
Refills: 0 | Status: COMPLETED | OUTPATIENT
Start: 2019-05-30 | End: 2020-04-27

## 2019-05-29 RX ORDER — SODIUM CHLORIDE 9 MG/ML
500 INJECTION, SOLUTION INTRAVENOUS
Refills: 0 | Status: DISCONTINUED | OUTPATIENT
Start: 2019-05-29 | End: 2019-05-30

## 2019-05-29 RX ORDER — NALOXONE HYDROCHLORIDE 4 MG/.1ML
0.1 SPRAY NASAL
Refills: 0 | Status: DISCONTINUED | OUTPATIENT
Start: 2019-05-29 | End: 2019-06-01

## 2019-05-29 RX ORDER — DEXAMETHASONE 0.5 MG/5ML
4 ELIXIR ORAL EVERY 6 HOURS
Refills: 0 | Status: DISCONTINUED | OUTPATIENT
Start: 2019-05-29 | End: 2019-06-01

## 2019-05-29 RX ORDER — ENOXAPARIN SODIUM 100 MG/ML
40 INJECTION SUBCUTANEOUS AT BEDTIME
Refills: 0 | Status: DISCONTINUED | OUTPATIENT
Start: 2019-05-29 | End: 2019-06-01

## 2019-05-29 RX ORDER — MAGNESIUM HYDROXIDE 400 MG/1
30 TABLET, CHEWABLE ORAL
Refills: 0 | Status: DISCONTINUED | OUTPATIENT
Start: 2019-05-30 | End: 2019-06-01

## 2019-05-29 RX ORDER — KETOROLAC TROMETHAMINE 30 MG/ML
30 SYRINGE (ML) INJECTION ONCE
Refills: 0 | Status: DISCONTINUED | OUTPATIENT
Start: 2019-05-29 | End: 2019-05-30

## 2019-05-29 RX ORDER — OXYCODONE HYDROCHLORIDE 5 MG/1
5 TABLET ORAL ONCE
Refills: 0 | Status: DISCONTINUED | OUTPATIENT
Start: 2019-05-30 | End: 2019-06-01

## 2019-05-29 RX ADMIN — SODIUM CHLORIDE 2000 MILLILITER(S): 9 INJECTION, SOLUTION INTRAVENOUS at 20:43

## 2019-05-29 RX ADMIN — SODIUM CHLORIDE 2000 MILLILITER(S): 9 INJECTION, SOLUTION INTRAVENOUS at 22:33

## 2019-05-29 RX ADMIN — Medication 100 MILLIGRAM(S): at 11:44

## 2019-05-29 RX ADMIN — ENOXAPARIN SODIUM 40 MILLIGRAM(S): 100 INJECTION SUBCUTANEOUS at 22:06

## 2019-05-29 NOTE — OB RN PATIENT PROFILE - FAMILY HISTORY
Father  Still living? Unknown  Family history of diabetes mellitus, Age at diagnosis: Age Unknown  Family history of prostate cancer, Age at diagnosis: Age Unknown

## 2019-05-29 NOTE — OB PROVIDER H&P - NSHPLABSRESULTS_GEN_ALL_CORE
GCT: 95    4/16/19: 33.2 weeks: EFW 2489 gms (64%), cephalic, ant placenta, MVP 66mm, BPP 8/8 11/20/18: 12.2 weeks: posterior placenta, NT 1.49 mm

## 2019-05-29 NOTE — BRIEF OPERATIVE NOTE - OPERATION/FINDINGS
viable male infant delivered in vertex presentation, Apgars 9/9, 3720g, clear fluid, normal appearing ovaries, tubes and uterus. viable male infant delivered in vertex presentation, Apgars 9/9, 3720g, clear fluid, normal appearing ovaries, tubes and uterus. Dense adhesion of the fascia to the rectus muscles, some adhesions notes on the vesicouterine peritoneum to the anterior abd wall. Dictation: #[id number]

## 2019-05-29 NOTE — PROGRESS NOTE ADULT - ASSESSMENT
38y P3, s/p repeat scheduled  delivery for, POD0, with decreased UOP, s/p 1500cc bolus  -f/u UOP  -f/u BPs  -Advance diet and activity as tolerated  -Encourage PO hydration, ambulation, incentive spirometry  -f/u AM labs    Dr. Hercules aware, will inform Dr. Sol aware

## 2019-05-29 NOTE — OB PROVIDER H&P - NSHPPHYSICALEXAM_GEN_ALL_CORE
T(C): 36.9 (05-29-19 @ 08:21), Max: 36.9 (05-29-19 @ 08:21)  HR: 85 (05-29-19 @ 09:34) (51 - 92)  BP: 116/74 (05-29-19 @ 08:21) (116/74 - 116/74)  RR: 18 (05-29-19 @ 08:21) (18 - 18)  SpO2: 97% (05-29-19 @ 09:29) (83% - 97%)    EFM: bpm, moderate variability, +accels  TOCO: q mins    Abd: soft, non tender, no incisional tenderness T(C): 36.9 (05-29-19 @ 08:21), Max: 36.9 (05-29-19 @ 08:21)  HR: 85 (05-29-19 @ 09:34) (51 - 92)  BP: 116/74 (05-29-19 @ 08:21) (116/74 - 116/74)  RR: 18 (05-29-19 @ 08:21) (18 - 18)  SpO2: 97% (05-29-19 @ 09:29) (83% - 97%)    EFM: 130 bpm, moderate variability, +accels  TOCO: none    Abd: soft, non tender, no incisional tenderness

## 2019-05-29 NOTE — OB PROVIDER H&P - NS_OBGYNHISTORY_OBGYN_ALL_OB_FT
OB Hx: C/S x 2. Largest _               SAB x 1.                ETOP x1 D&C x 1          Year? GA? /CS? Sex? Weight? Hospital? Complications? Epidural?  G1  ETOP x1 (D&C)  G2  FT C/S NRFHT  G3 2009 SAB x 1 (no d&c)  G4 2015 FT c/s failed , arrest of dilation    Gyn Hx:  Denies cysts, fibroids, abnormal paps, and STIs. OB Hx: C/S x 2. Largest 7-5               SAB x 1.                ETOP x1 D&C x 1    G1  ETOP x1 D&C  G2 2001 FT C/S NRFHT F 6-6 [facility] Received Epidural  G3  SAB x 1 no d&c  G4 2015 FT c/s failed , arrest of dilation 6 cm, F 7-5 [facility] No complications Received Epidural    Gyn Hx:  Denies cysts, fibroids, abnormal paps, and STIs.

## 2019-05-29 NOTE — BRIEF OPERATIVE NOTE - NSICDXBRIEFPOSTOP_GEN_ALL_CORE_FT
POST-OP DIAGNOSIS:  History of  section 29-May-2019 14:20:49  Raissa Gupta  39 weeks gestation of pregnancy 29-May-2019 14:20:03  Raissa Gupta

## 2019-05-29 NOTE — OB PROVIDER H&P - ASSESSMENT
38y GP @ weeks, AMA, h/o prior c/s x _, scheduled rpt c/s x _.    Plan:  Admit to L&D  IVF  NPO diet  Continuous TOCO/EFM  Ancef prior to OR  Abdominal prep  SCDs B/L  Pain Management PRN    Dr. chavez 38y  @ 39.3 weeks, AMA, h/o prior c/s x 2, scheduled rpt c/s x 3.    Plan:  Admit to L&D  IVF  Labs (HIV, Rubeola)  NPO diet  Continuous TOCO/EFM  Ancef prior to OR  Abdominal prep  SCDs B/L  Pain Management PRN    Dr. Sol aware

## 2019-05-29 NOTE — OB PROVIDER H&P - HISTORY OF PRESENT ILLNESS
38y  @ 39.3 weeks by LMP, EDC 19, present for scheduled repeat C/S. Transient htn during pregnancy. Patient has h/o prior C/S x _. Denies VB, LOF, and ctx. +FM. No complications during this pregnancy. Last seen in office _ by  _. 38y  @ 39.3 weeks by LMP, EDC 19, present for scheduled repeat C/S. Transient htn during pregnancy. Patient has h/o prior C/S x 2. Denies VB, LOF, and ctx. +FM. No complications during this pregnancy. Last seen in office 19 by Dr. Sol. 38y  @ 39.3 weeks by LMP, EDC 19, present for scheduled repeat C/S. Transient htn during pregnancy. Patient has h/o prior C/S x 2. Denies VB, LOF, and ctx. +FM. No complications during this pregnancy. Last seen in office 19 by Dr. Sol.    hgb/hct: 11.5/33.8

## 2019-05-29 NOTE — BRIEF OPERATIVE NOTE - NSICDXBRIEFPREOP_GEN_ALL_CORE_FT
PRE-OP DIAGNOSIS:  History of 2  sections 29-May-2019 14:19:14  Raissa Gupta  39 weeks gestation of pregnancy 29-May-2019 14:18:29  Raissa Gupta

## 2019-05-29 NOTE — PROGRESS NOTE ADULT - SUBJECTIVE AND OBJECTIVE BOX
PGY 2 Post Op c/s note    Pt seen and evaluated at bedside, POD0, recovering well, no complaints. Pain well controlled. Pt is on bedrest, NPO.  [place] in place, not passing gas, no BM  Breast/Bottlefeeding    Physical Exam  T(F): 97.5 (29 May 2019 14:17), Max: 98.5 (29 May 2019 08:21)  HR: 68 (29 May 2019 19:34) (51 - 96)  BP: 111/63 (29 May 2019 19:33) (99/58 - 123/75)  RR: 18 (29 May 2019 14:32) (18 - 20)  SpO2: 99% (29 May 2019 19:34) (83% - 100%)    UO (0470-3412): UO 20/25 --> 1L bolus --> 55--> 500cc bolus --> 30cc --> 40cc    Gen: NAD  CVS: RRR, normal S1, S2  Lungs: CTAB  Abdomen: soft, non tender, non distended,  no BS  -Incision: dressing in place, clean/dry, no surrounding tenderness or erythema  -Fundus: firm, non tender, below umbilicus  LE: no edema or tenderness  Lochia: Minimal Rubra    Labs                        11.5   7.03  )-----------( 163      ( 28 May 2019 16:05 )             33.8       Meds  butorphanol Injectable 2 milliGRAM(s) IV Push once PRN  dexamethasone  Injectable 4 milliGRAM(s) IV Push every 6 hours PRN  ketorolac   Injectable 30 milliGRAM(s) IV Push once PRN  lactated ringers. 500 milliLiter(s) IV Continuous <Continuous>  lactated ringers. 1000 milliLiter(s) IV Continuous <Continuous>  lactated ringers. 1000 milliLiter(s) IV Continuous <Continuous>  morphine PF Spinal 0.2 milliGRAM(s) IntraThecal. once  naloxone Injectable 0.1 milliGRAM(s) IV Push every 3 minutes PRN  ondansetron Injectable 4 milliGRAM(s) IV Push every 6 hours PRN  oxytocin Infusion 333.333 milliUNIT(s)/Min IV Continuous <Continuous>

## 2019-05-30 DIAGNOSIS — Z71.89 OTHER SPECIFIED COUNSELING: ICD-10-CM

## 2019-05-30 LAB
ANION GAP SERPL CALC-SCNC: 10 MMOL/L — SIGNIFICANT CHANGE UP (ref 7–14)
ANION GAP SERPL CALC-SCNC: 11 MMOL/L — SIGNIFICANT CHANGE UP (ref 7–14)
BASOPHILS # BLD AUTO: 0.01 K/UL — SIGNIFICANT CHANGE UP (ref 0–0.2)
BASOPHILS NFR BLD AUTO: 0.1 % — SIGNIFICANT CHANGE UP (ref 0–1)
BUN SERPL-MCNC: 7 MG/DL — LOW (ref 10–20)
BUN SERPL-MCNC: 8 MG/DL — LOW (ref 10–20)
CALCIUM SERPL-MCNC: 8.1 MG/DL — LOW (ref 8.5–10.1)
CALCIUM SERPL-MCNC: 8.6 MG/DL — SIGNIFICANT CHANGE UP (ref 8.5–10.1)
CHLORIDE SERPL-SCNC: 102 MMOL/L — SIGNIFICANT CHANGE UP (ref 98–110)
CHLORIDE SERPL-SCNC: 103 MMOL/L — SIGNIFICANT CHANGE UP (ref 98–110)
CO2 SERPL-SCNC: 21 MMOL/L — SIGNIFICANT CHANGE UP (ref 17–32)
CO2 SERPL-SCNC: 24 MMOL/L — SIGNIFICANT CHANGE UP (ref 17–32)
CREAT ?TM UR-MCNC: 295 MG/DL — SIGNIFICANT CHANGE UP
CREAT SERPL-MCNC: 0.5 MG/DL — LOW (ref 0.7–1.5)
CREAT SERPL-MCNC: 0.5 MG/DL — LOW (ref 0.7–1.5)
EOSINOPHIL # BLD AUTO: 0.06 K/UL — SIGNIFICANT CHANGE UP (ref 0–0.7)
EOSINOPHIL NFR BLD AUTO: 0.8 % — SIGNIFICANT CHANGE UP (ref 0–8)
GLUCOSE SERPL-MCNC: 109 MG/DL — HIGH (ref 70–99)
GLUCOSE SERPL-MCNC: 85 MG/DL — SIGNIFICANT CHANGE UP (ref 70–99)
HCT VFR BLD CALC: 30.4 % — LOW (ref 37–47)
HCT VFR BLD CALC: 30.8 % — LOW (ref 37–47)
HGB BLD-MCNC: 10.1 G/DL — LOW (ref 12–16)
HGB BLD-MCNC: 10.2 G/DL — LOW (ref 12–16)
IMM GRANULOCYTES NFR BLD AUTO: 0.3 % — SIGNIFICANT CHANGE UP (ref 0.1–0.3)
LYMPHOCYTES # BLD AUTO: 0.91 K/UL — LOW (ref 1.2–3.4)
LYMPHOCYTES # BLD AUTO: 12.3 % — LOW (ref 20.5–51.1)
MCHC RBC-ENTMCNC: 27.4 PG — SIGNIFICANT CHANGE UP (ref 27–31)
MCHC RBC-ENTMCNC: 27.9 PG — SIGNIFICANT CHANGE UP (ref 27–31)
MCHC RBC-ENTMCNC: 32.8 G/DL — SIGNIFICANT CHANGE UP (ref 32–37)
MCHC RBC-ENTMCNC: 33.6 G/DL — SIGNIFICANT CHANGE UP (ref 32–37)
MCV RBC AUTO: 83.3 FL — SIGNIFICANT CHANGE UP (ref 81–99)
MCV RBC AUTO: 83.7 FL — SIGNIFICANT CHANGE UP (ref 81–99)
MEV IGG SER-ACNC: 55.3 AU/ML — SIGNIFICANT CHANGE UP
MEV IGG+IGM SER-IMP: POSITIVE — SIGNIFICANT CHANGE UP
MONOCYTES # BLD AUTO: 0.53 K/UL — SIGNIFICANT CHANGE UP (ref 0.1–0.6)
MONOCYTES NFR BLD AUTO: 7.2 % — SIGNIFICANT CHANGE UP (ref 1.7–9.3)
NEUTROPHILS # BLD AUTO: 5.88 K/UL — SIGNIFICANT CHANGE UP (ref 1.4–6.5)
NEUTROPHILS NFR BLD AUTO: 79.3 % — HIGH (ref 42.2–75.2)
NRBC # BLD: 0 /100 WBCS — SIGNIFICANT CHANGE UP (ref 0–0)
NRBC # BLD: 0 /100 WBCS — SIGNIFICANT CHANGE UP (ref 0–0)
PLATELET # BLD AUTO: 136 K/UL — SIGNIFICANT CHANGE UP (ref 130–400)
PLATELET # BLD AUTO: 153 K/UL — SIGNIFICANT CHANGE UP (ref 130–400)
POTASSIUM SERPL-MCNC: 3.7 MMOL/L — SIGNIFICANT CHANGE UP (ref 3.5–5)
POTASSIUM SERPL-MCNC: 4 MMOL/L — SIGNIFICANT CHANGE UP (ref 3.5–5)
POTASSIUM SERPL-SCNC: 3.7 MMOL/L — SIGNIFICANT CHANGE UP (ref 3.5–5)
POTASSIUM SERPL-SCNC: 4 MMOL/L — SIGNIFICANT CHANGE UP (ref 3.5–5)
RBC # BLD: 3.65 M/UL — LOW (ref 4.2–5.4)
RBC # BLD: 3.68 M/UL — LOW (ref 4.2–5.4)
RBC # FLD: 15.4 % — HIGH (ref 11.5–14.5)
RBC # FLD: 15.4 % — HIGH (ref 11.5–14.5)
SODIUM SERPL-SCNC: 134 MMOL/L — LOW (ref 135–146)
SODIUM SERPL-SCNC: 137 MMOL/L — SIGNIFICANT CHANGE UP (ref 135–146)
SODIUM UR-SCNC: 82 MMOL/L — SIGNIFICANT CHANGE UP
WBC # BLD: 7.41 K/UL — SIGNIFICANT CHANGE UP (ref 4.8–10.8)
WBC # BLD: 7.59 K/UL — SIGNIFICANT CHANGE UP (ref 4.8–10.8)
WBC # FLD AUTO: 7.41 K/UL — SIGNIFICANT CHANGE UP (ref 4.8–10.8)
WBC # FLD AUTO: 7.59 K/UL — SIGNIFICANT CHANGE UP (ref 4.8–10.8)

## 2019-05-30 RX ORDER — SODIUM CHLORIDE 9 MG/ML
500 INJECTION, SOLUTION INTRAVENOUS ONCE
Refills: 0 | Status: DISCONTINUED | OUTPATIENT
Start: 2019-05-30 | End: 2019-05-30

## 2019-05-30 RX ADMIN — Medication 30 MILLIGRAM(S): at 10:46

## 2019-05-30 RX ADMIN — SIMETHICONE 80 MILLIGRAM(S): 80 TABLET, CHEWABLE ORAL at 17:01

## 2019-05-30 RX ADMIN — Medication 100 MILLIGRAM(S): at 17:01

## 2019-05-30 RX ADMIN — SIMETHICONE 80 MILLIGRAM(S): 80 TABLET, CHEWABLE ORAL at 12:03

## 2019-05-30 RX ADMIN — ENOXAPARIN SODIUM 40 MILLIGRAM(S): 100 INJECTION SUBCUTANEOUS at 22:35

## 2019-05-30 RX ADMIN — Medication 975 MILLIGRAM(S): at 17:02

## 2019-05-30 RX ADMIN — Medication 975 MILLIGRAM(S): at 22:36

## 2019-05-30 NOTE — PROGRESS NOTE ADULT - ASSESSMENT
s/p repeat c/s, POD 1, complicated by decreased urineoutpt, likely 2/2 postoperative fluid shifts vs inadequate fluid resuscitation  repeat labs at noon  lovenox  out of bed to chair  regular diet   pain management   reassess    Will inform attending on call

## 2019-05-30 NOTE — PROGRESS NOTE ADULT - SUBJECTIVE AND OBJECTIVE BOX
PGY 3 note     Subjective: no complaints    Breastfeeding: trying to breast feed  Flatus: yes  BM: no  Voiding: marcos in    Physical exam:    Vital Signs Last 24 Hrs  T(C): 36.5 (30 May 2019 05:16), Max: 36.9 (29 May 2019 08:21)  T(F): 97.7 (30 May 2019 05:16), Max: 98.5 (29 May 2019 08:21)  HR: 76 (30 May 2019 05:16) (51 - 102)  BP: 110/58 (30 May 2019 05:16) (99/58 - 124/76)  BP(mean): --  RR: 18 (30 May 2019 05:16) (18 - 20)  SpO2: 98% (30 May 2019 02:09) (83% - 100%)    Gen: NAD  CVS: RRR  Lungs: CTAB  Abdomen: Soft, nontender, no distension , firm uterine fundus at umbilicus, dressing changed c/d/i  Pelvic: Normal lochia noted  Ext: No calf tenderness    UO: recieved total of 4.5L overnight--> 47cc/44cc from 4AM --> 6AM    Diet: regular     meds:   ceFAZolin   IVPB   100 mL/Hr IV Intermittent (05-29-19 @ 11:44)    enoxaparin Injectable   40 milliGRAM(s) SubCutaneous (05-29-19 @ 22:06)    lactated ringers Bolus   2000 mL/Hr IV Bolus (05-29-19 @ 20:43)    lactated ringers Bolus   2000 mL/Hr IV Bolus (05-29-19 @ 22:33)        LABS:                        10.2   7.59  )-----------( 136      ( 30 May 2019 03:39 )             30.4                         10.7   8.18  )-----------( 142      ( 29 May 2019 21:05 )             33.0                         11.5   7.03  )-----------( 163      ( 28 May 2019 16:05 )             33.8       05-30-19 @ 03:39      134<L>  |  102  |  8<L>  ----------------------------<  109<H>  3.7   |  21  |  0.5<L>    05-29-19 @ 21:05      136  |  102  |  8<L>  ----------------------------<  92  3.7   |  23  |  0.6<L>    05-28-19 @ 16:05      138  |  106  |  11  ----------------------------<  76  3.7   |  20  |  0.7        Ca    8.1<L>      30 May 2019 03:39  Ca    8.2<L>      29 May 2019 21:05  Ca    8.8      28 May 2019 16:05    TPro  6.4  /  Alb  3.6  /  TBili  0.2  /  DBili  x   /  AST  12  /  ALT  6   /  AlkPhos  82  05-28-19 @ 16:05

## 2019-05-31 RX ORDER — IBUPROFEN 200 MG
600 TABLET ORAL EVERY 6 HOURS
Refills: 0 | Status: DISCONTINUED | OUTPATIENT
Start: 2019-05-31 | End: 2019-06-01

## 2019-05-31 RX ADMIN — Medication 600 MILLIGRAM(S): at 23:41

## 2019-05-31 RX ADMIN — SIMETHICONE 80 MILLIGRAM(S): 80 TABLET, CHEWABLE ORAL at 17:51

## 2019-05-31 RX ADMIN — Medication 100 MILLIGRAM(S): at 05:56

## 2019-05-31 RX ADMIN — ENOXAPARIN SODIUM 40 MILLIGRAM(S): 100 INJECTION SUBCUTANEOUS at 21:49

## 2019-05-31 RX ADMIN — SIMETHICONE 80 MILLIGRAM(S): 80 TABLET, CHEWABLE ORAL at 11:16

## 2019-05-31 RX ADMIN — Medication 600 MILLIGRAM(S): at 17:51

## 2019-05-31 RX ADMIN — Medication 975 MILLIGRAM(S): at 21:25

## 2019-05-31 RX ADMIN — SIMETHICONE 80 MILLIGRAM(S): 80 TABLET, CHEWABLE ORAL at 23:42

## 2019-05-31 RX ADMIN — Medication 975 MILLIGRAM(S): at 15:08

## 2019-05-31 RX ADMIN — Medication 600 MILLIGRAM(S): at 11:16

## 2019-05-31 RX ADMIN — SIMETHICONE 80 MILLIGRAM(S): 80 TABLET, CHEWABLE ORAL at 05:54

## 2019-05-31 NOTE — PROGRESS NOTE ADULT - ASSESSMENT
s/p repeat c/s complicated by decreased urineoutpt postoperatively, resolved with 5 L fluid bolus, marcos out and voided multiple times without difficulty, POD 2 recovering well  ambulation  regular diet  lovenox  pain management  dc home tomorrow    Will inform Dr. Sol

## 2019-05-31 NOTE — PROGRESS NOTE ADULT - SUBJECTIVE AND OBJECTIVE BOX
PGY 3 Post Partum note    Subjective: no complaints    Breastfeeding: attempting, mostly bottle  Flatus: yes  BM: yes  Voiding: yes    Physical exam:    Vital Signs Last 24 Hrs  T(C): 37 (31 May 2019 03:31), Max: 37 (31 May 2019 03:31)  T(F): 98.6 (31 May 2019 03:31), Max: 98.6 (31 May 2019 03:31)  HR: 71 (31 May 2019 03:31) (63 - 74)  BP: 118/72 (31 May 2019 03:31) (110/64 - 135/74)  BP(mean): --  RR: 18 (31 May 2019 03:31) (14 - 20)  SpO2: --    Gen: NAD  CVS: RRR  Lungs: CTAB  Abdomen: Soft, nontender, no distension , firm uterine fundus at umbilicus, incision c/d/i  Pelvic: Normal lochia noted  Ext: No calf tenderness    Diet: regular    meds:   acetaminophen   Tablet ..   975 milliGRAM(s) Oral (05-30-19 @ 22:36)   975 milliGRAM(s) Oral (05-30-19 @ 17:02)    docusate sodium   100 milliGRAM(s) Oral (05-31-19 @ 05:56)   100 milliGRAM(s) Oral (05-30-19 @ 17:01)    enoxaparin Injectable   40 milliGRAM(s) SubCutaneous (05-30-19 @ 22:35)    ketorolac   Injectable   30 milliGRAM(s) IV Push (05-30-19 @ 10:46)    simethicone   80 milliGRAM(s) Chew (05-31-19 @ 05:54)   80 milliGRAM(s) Chew (05-30-19 @ 17:01)   80 milliGRAM(s) Chew (05-30-19 @ 12:03)        LABS:                        10.1   7.41  )-----------( 153      ( 30 May 2019 11:26 )             30.8                         10.2   7.59  )-----------( 136      ( 30 May 2019 03:39 )             30.4                         10.7   8.18  )-----------( 142      ( 29 May 2019 21:05 )             33.0       05-30-19 @ 11:26      137  |  103  |  7<L>  ----------------------------<  85  4.0   |  24  |  0.5<L>    05-30-19 @ 03:39      134<L>  |  102  |  8<L>  ----------------------------<  109<H>  3.7   |  21  |  0.5<L>    05-29-19 @ 21:05      136  |  102  |  8<L>  ----------------------------<  92  3.7   |  23  |  0.6<L>    05-28-19 @ 16:05      138  |  106  |  11  ----------------------------<  76  3.7   |  20  |  0.7        Ca    8.6      30 May 2019 11:26  Ca    8.1<L>      30 May 2019 03:39  Ca    8.2<L>      29 May 2019 21:05  Ca    8.8      28 May 2019 16:05    TPro  6.4  /  Alb  3.6  /  TBili  0.2  /  DBili  x   /  AST  12  /  ALT  6   /  AlkPhos  82  05-28-19 @ 16:05        Allergies    Ancef (Hives)    Intolerances

## 2019-06-01 ENCOUNTER — TRANSCRIPTION ENCOUNTER (OUTPATIENT)
Age: 39
End: 2019-06-01

## 2019-06-01 VITALS
SYSTOLIC BLOOD PRESSURE: 130 MMHG | RESPIRATION RATE: 17 BRPM | HEART RATE: 62 BPM | DIASTOLIC BLOOD PRESSURE: 68 MMHG | TEMPERATURE: 97 F

## 2019-06-01 RX ORDER — IBUPROFEN 200 MG
1 TABLET ORAL
Qty: 0 | Refills: 0 | DISCHARGE
Start: 2019-06-01

## 2019-06-01 RX ORDER — ACETAMINOPHEN 500 MG
3 TABLET ORAL
Qty: 0 | Refills: 0 | DISCHARGE
Start: 2019-06-01

## 2019-06-01 RX ADMIN — Medication 600 MILLIGRAM(S): at 11:22

## 2019-06-01 RX ADMIN — SIMETHICONE 80 MILLIGRAM(S): 80 TABLET, CHEWABLE ORAL at 05:57

## 2019-06-01 RX ADMIN — Medication 975 MILLIGRAM(S): at 09:18

## 2019-06-01 RX ADMIN — Medication 600 MILLIGRAM(S): at 12:23

## 2019-06-01 RX ADMIN — SIMETHICONE 80 MILLIGRAM(S): 80 TABLET, CHEWABLE ORAL at 11:22

## 2019-06-01 RX ADMIN — Medication 975 MILLIGRAM(S): at 08:46

## 2019-06-01 RX ADMIN — Medication 100 MILLIGRAM(S): at 05:57

## 2019-06-01 RX ADMIN — Medication 600 MILLIGRAM(S): at 05:57

## 2019-06-01 NOTE — DISCHARGE NOTE NURSING/CASE MANAGEMENT/SOCIAL WORK - NSDCDPATPORTLINK_GEN_ALL_CORE
You can access the [facility], offered by [facility], by registering with the following website: [email or web address]

## 2019-06-01 NOTE — DISCHARGE NOTE OB - PATIENT PORTAL LINK FT
You can access the [facility] Patient Portal, offered by [facility], by registering with the following website: [email or web address]

## 2019-06-01 NOTE — PROGRESS NOTE ADULT - ASSESSMENT
s/p repeat c/s complicated by decreased urineoutpt postoperatively, resolved with 5 L fluid bolus, marcos out and voided multiple times without difficulty, POD 3 recovering well    ambulation  regular diet  lovenox  pain management  anticipate dc home today  PO hydration and ambulation encouraged  Incentive spirometry encouraged    Dr. Alvarez and Dr. Sol to be made aware

## 2019-06-01 NOTE — PROGRESS NOTE ADULT - SUBJECTIVE AND OBJECTIVE BOX
Chief Complaint: Post  section POD3    HPI: Pt doing well, pain well controlled. No overnight events, no acute complaints. She is ambulating, tolerating regular diet, voiding without difficulty, has had a bowel movement and is passing flatus. Denies fevers, chills, chest pain, SOB, N/V,  extremity pain or swelling    ROS: Denies cardiovascular or respiratory symptoms    PAST MEDICAL & SURGICAL HISTORY:  No pertinent past medical history  History of D&C  History of  section: x 2      Physical Exam  Vital Signs Last 24 Hrs  T(F): 96.4 (2019 00:59), Max: 98.3 (31 May 2019 15:15)  HR: 65 (2019 00:59) (63 - 65)  BP: 127/88 (2019 00:59) (118/70 - 128/74)  RR: 18 (2019 00:59) (18 - 18)    Physical exam:  General - AAOx3, NAD  Heart - S1S2, RRR  Lungs - CTA BL  Abdomen:  - Soft, nontender, nondistended, BS+  - Fundus firm, nontender, below the umbilicus  - Incision: steri strips in place. Clean/dry/intact  Pelvis/Vagina - minimal rubra noted  Extremities: No calf tenderness, no swelling, no erythema    Labs:                        10.1   7.41  )-----------( 153      ( 30 May 2019 11:26 )             30.8                         10.2   7.59  )-----------( 136      ( 30 May 2019 03:39 )             30.4     Type + Screen: A POS (19 @ 16:05)  Antibody Screen: NEG (19 @ 16:05)

## 2019-06-01 NOTE — DISCHARGE NOTE OB - CARE PROVIDER_API CALL
Sergey Sol)  Obstetrics and Gynecology  [address] [place] [address]  Phone: [phone]  Fax: [phone]  Follow Up Time:

## 2019-06-01 NOTE — DISCHARGE NOTE OB - PLAN OF CARE
Healthy mother and baby Nothing in the vagina for 6 weeks - no sex, tampons, douching, tub baths or pools. May Shower.  Follow up with Dr. Sol in 1 week  In case of fever 100.4 or over, excessive bleeding, severe pain uncontrolled with meds, or leg pain, redness or swelling, please go to the emergency department

## 2019-06-01 NOTE — DISCHARGE NOTE OB - MEDICATION SUMMARY - MEDICATIONS TO STOP TAKING
I will STOP taking the medications listed below when I get home from the hospital:  None I will STOP taking the medications listed below when I get home from the hospital:    Reglan 10 mg oral tablet  -- 1 tab(s) by mouth every 8 hours, As Needed   -- It is very important that you take or use this exactly as directed.  Do not skip doses or discontinue unless directed by your doctor.  May cause drowsiness.  Alcohol may intensify this effect.  Use care when operating dangerous machinery.  Take medication on an empty stomach 1 hour before or 2 to 3 hours after a meal unless otherwise directed by your doctor.

## 2019-06-01 NOTE — DISCHARGE NOTE OB - CARE PLAN
Principal Discharge DX:	S/P  section  Goal:	Healthy mother and baby  Assessment and plan of treatment:	Nothing in the vagina for 6 weeks - no sex, tampons, douching, tub baths or pools. May Shower.  Follow up with Dr. Sol in 1 week  In case of fever 100.4 or over, excessive bleeding, severe pain uncontrolled with meds, or leg pain, redness or swelling, please go to the emergency department

## 2019-06-01 NOTE — DISCHARGE NOTE OB - HOSPITAL COURSE
DATE OF DISCHARGE: 19 @ 07:34    HISTORY OF PRESENT ILLNESS/HOSPITAL COURSE: HPI:  38y  @ 39.3 weeks by LMP, EDC 19, present for scheduled repeat C/S. Transient htn during pregnancy. Patient has h/o prior C/S x 2. Denies VB, LOF, and ctx. +FM. No complications during this pregnancy. Last seen in office 19 by Dr. Sol.    hgb/hct: 11.5/33.8 (29 May 2019 09:01)    PAST MEDICAL & SURGICAL HISTORY:  No pertinent past medical history  History of D&C  History of  section: x 2    PROCEDURES PERFORMED:  section    POST PARTUM COURSE: complicated by decreased UO postop improved s/p 5L boluses, has voided, asymptomatic. Otherwise, recovering well. Discharged home on POD3      LABS:                        10.1   7.41  )-----------( 153      ( 30 May 2019 11:26 )             30.8       19 @ 11:26    137  |  103  |  7<L>  ----------------------------<  85  4.0   |  24  |  0.5<L>    19 @ 03:39    134<L>  |  102  |  8<L>  ----------------------------<  109<H>  3.7   |  21  |  0.5<L>    19 @ 21:05      136  |  102  |  8<L>  ----------------------------<  92  3.7   |  23  |  0.6<L>    Ca    8.6      30 May 2019 11:26  Ca    8.1<L>      30 May 2019 03:39  Ca    8.2<L>      29 May 2019 21:05    Allergies Ancef (Hives)

## 2019-06-01 NOTE — DISCHARGE NOTE OB - ADDITIONAL INSTRUCTIONS
In case of fever 100.4 or over, excessive bleeding, severe pain uncontrolled with meds, or leg pain, redness or swelling, please go to the emergency department

## 2019-06-05 DIAGNOSIS — O34.211 MATERNAL CARE FOR LOW TRANSVERSE SCAR FROM PREVIOUS CESAREAN DELIVERY: ICD-10-CM

## 2019-06-05 DIAGNOSIS — Z3A.39 39 WEEKS GESTATION OF PREGNANCY: ICD-10-CM

## 2019-06-05 DIAGNOSIS — Z28.09 IMMUNIZATION NOT CARRIED OUT BECAUSE OF OTHER CONTRAINDICATION: ICD-10-CM

## 2019-06-18 ENCOUNTER — APPOINTMENT (OUTPATIENT)
Dept: OBGYN | Facility: CLINIC | Age: 39
End: 2019-06-18
Payer: MEDICAID

## 2019-06-18 PROCEDURE — 99024 POSTOP FOLLOW-UP VISIT: CPT

## 2019-07-09 ENCOUNTER — APPOINTMENT (OUTPATIENT)
Dept: OBGYN | Facility: CLINIC | Age: 39
End: 2019-07-09
Payer: MEDICAID

## 2019-07-09 PROCEDURE — 99024 POSTOP FOLLOW-UP VISIT: CPT

## 2019-07-23 ENCOUNTER — APPOINTMENT (OUTPATIENT)
Dept: OBGYN | Facility: CLINIC | Age: 39
End: 2019-07-23

## 2019-08-15 ENCOUNTER — APPOINTMENT (OUTPATIENT)
Dept: OBGYN | Facility: CLINIC | Age: 39
End: 2019-08-15
Payer: MEDICAID

## 2020-01-06 ENCOUNTER — EMERGENCY (EMERGENCY)
Facility: HOSPITAL | Age: 40
LOS: 0 days | Discharge: HOME | End: 2020-01-07
Attending: EMERGENCY MEDICINE | Admitting: EMERGENCY MEDICINE
Payer: MEDICAID

## 2020-01-06 VITALS
TEMPERATURE: 98 F | OXYGEN SATURATION: 99 % | SYSTOLIC BLOOD PRESSURE: 117 MMHG | DIASTOLIC BLOOD PRESSURE: 85 MMHG | RESPIRATION RATE: 18 BRPM | HEART RATE: 82 BPM

## 2020-01-06 DIAGNOSIS — Z98.891 HISTORY OF UTERINE SCAR FROM PREVIOUS SURGERY: Chronic | ICD-10-CM

## 2020-01-06 DIAGNOSIS — M25.551 PAIN IN RIGHT HIP: ICD-10-CM

## 2020-01-06 DIAGNOSIS — M54.41 LUMBAGO WITH SCIATICA, RIGHT SIDE: ICD-10-CM

## 2020-01-06 DIAGNOSIS — Z98.890 OTHER SPECIFIED POSTPROCEDURAL STATES: Chronic | ICD-10-CM

## 2020-01-06 PROCEDURE — 99283 EMERGENCY DEPT VISIT LOW MDM: CPT

## 2020-01-06 RX ORDER — IBUPROFEN 200 MG
1 TABLET ORAL
Qty: 20 | Refills: 0
Start: 2020-01-06 | End: 2020-01-10

## 2020-01-06 RX ORDER — METHOCARBAMOL 500 MG/1
2 TABLET, FILM COATED ORAL
Qty: 24 | Refills: 0
Start: 2020-01-06 | End: 2020-01-08

## 2020-01-06 NOTE — ED PROVIDER NOTE - OBJECTIVE STATEMENT
40 y/o female no pmhx 40 y/o female no pmhx presents with R hip pain. She notes that for the past few weeks, she has been having shooting pain down the lateral aspect of her right hip, going from the hip down to her feet. This has been occurring more and more frequently since 2 days ago, worsened with transitioning from sitting to standing and when flexing her hip. 38 y/o female no pmhx presents with R hip pain. She notes that for the past few weeks, she has been having shooting pain down the lateral aspect of her right hip, going from the hip down to her feet. This has been occurring more and more frequently since 2 days ago, worsened with transitioning from sitting to standing and when flexing her hip. She denies any perianal or genital parasthesias, no urinary retention. Denies fever/chills/weakness/gait imbalance/trauma.

## 2020-01-06 NOTE — ED PROVIDER NOTE - NSFOLLOWUPCLINICS_GEN_ALL_ED_FT
[facility] ([facility])  Rehabilitation  [facility] [address], [address] [place] [address]  Phone: [phone]  Fax:   Follow Up Time: 4-6 Days

## 2020-01-06 NOTE — ED ADULT NURSE NOTE - IS THE PATIENT ABLE TO BE SCREENED?
Patient presents ambulatory to treatment area with a steady gait. Patient complains of pelvic pain that began Friday and and has worsened since onset. Patient states she has had diarrhea and some nausea with the pain. Patient states pain is worse after intercourse and \"hurts all the way to my thighs\". Denies fever. Patient has also had dysuria and urinary frequency. No

## 2020-01-06 NOTE — ED PROVIDER NOTE - CLINICAL SUMMARY MEDICAL DECISION MAKING FREE TEXT BOX
Patient evaluated for leg pain, + scitica, + ambulatory, no saddle anasthesia. I have fully discussed the medical management and delivery of care with the patient. I have discussed any available labs, imaging and treatment options with the patient. Patient confirms understanding and has been given detailed return precautions. Patient instructed to return to the ED should symptoms persist or worsen. Patient has demonstrated capacity and has verbalized understanding. Patient is well appearing upon discharge.

## 2020-01-06 NOTE — ED ADULT NURSE NOTE - NS ED NURSE RECORD ANOTHER HT AND WT
Quality 137: Melanoma: Continuity Of Care - Recall System: Patient information entered into a recall system that includes: target date for the next exam specified AND a process to follow up with patients regarding missed or unscheduled appointments Quality 224: Stage 0-Iic Melanoma: Overutilization Of Imaging Studies For Only Stage 0-Iic Melanoma: None of the following diagnostic imaging studies ordered: chest X-ray, CT, Ultrasound, MRI, PET, or nuclear medicine scans (ML) Detail Level: Simple Hide Additional Notes?: No Detail Level: Zone Yes

## 2020-01-06 NOTE — ED PROVIDER NOTE - PROGRESS NOTE DETAILS
ATTENDING NOTE:   40 y/o F with PMH of  several months ago now coming in c/o several weeks of R lower back pain associated with numbness/tingling down R leg. Denies CP, Sob, n/v/d, perianal anesthesia, saddle anesthesia, urinary or bowel incontinence.   On exam: WDWN; in NADs. Sitting up and providing appropriate history and physical examination SKIN: warm and dry, no acute rash. EXT: (+) TTP over R SI joint, (+) straight leg raise, pt ambulating in ED. Normal ROM. No clubbing, cyanosis or edema. Dp and Pt Pulses intact. Cap refill less than 3 seconds. NEURO: CN 2-12 intact, normal finger to nose, normal romberg, stable gait, no sensory or motor deficits, Alert, oriented, grossly unremarkable. No Focal deficits. GCS 15. NIH 0. PSYCH: Cooperative, appropriate. SR: 39 year old female presents here c/o right hip pain. Pain has been going on for several weeks radiaties down right foot. Non traumtic. Patient denies any fever chills iv drug use urinary frequency.urgenc burning incontience or perianal anesthesia. On exam + Right PSIS & SI ttp. NO midline ttp.

## 2020-01-06 NOTE — ED PROVIDER NOTE - PATIENT PORTAL LINK FT
You can access the FollowMyHealth Patient Portal offered by [facility] by registering at the following website: [email or web address]. By joining [email or web address]’s FollowMyHealth portal, you will also be able to view your health information using other applications (apps) compatible with our system.

## 2020-01-06 NOTE — ED PROVIDER NOTE - NS_ATTENDINGSCRIBE_ED_ALL_ED
"Subjective:      Patient ID: Madeline Garcia is a 69 y.o. female.    Chief Complaint: Diabetes Mellitus (dry feet pcp dr. Kay 3-12-18); Diabetic Foot Exam; Callouses (left foot little toe ); and Foot Problem (right foot drop, cramps in toes )    Madeline is a 69 y.o. female who presents to the clinic upon referral from Dr. Kay  for evaluation and treatment of diabetic feet. Madeline has a past medical history of Allergy; Arthritis; Atrial fibrillation (2007); Diabetes mellitus, type 2; GERD (gastroesophageal reflux disease); Heart murmur; Hypercalcemia; Hypertension; and Thyroid disease. Patient relates no major problem with feet. Only complaints today consist of callus to the left 4th interspace.    Patient has history of "pinched nerve" to the back following a fall which resulted in right foot drop. Uses cane for gait assistance     PCP: Isabela Kay MD    Date Last Seen by PCP:   Chief Complaint   Patient presents with    Diabetes Mellitus     dry feet pcp dr. Kay 3-12-18    Diabetic Foot Exam    Callouses     left foot little toe     Foot Problem     right foot drop, cramps in toes        Current shoe gear: Tennis shoes    Hemoglobin A1C   Date Value Ref Range Status   03/12/2018 5.7 (H) 4.0 - 5.6 % Final     Comment:     According to ADA guidelines, hemoglobin A1c <7.0% represents  optimal control in non-pregnant diabetic patients. Different  metrics may apply to specific patient populations.   Standards of Medical Care in Diabetes-2016.  For the purpose of screening for the presence of diabetes:  <5.7%     Consistent with the absence of diabetes  5.7-6.4%  Consistent with increasing risk for diabetes   (prediabetes)  >or=6.5%  Consistent with diabetes  Currently, no consensus exists for use of hemoglobin A1c  for diagnosis of diabetes for children.  This Hemoglobin A1c assay has significant interference with fetal   hemoglobin   (HbF). The results are invalid for patients with abnormal " amounts of   HbF,   including those with known Hereditary Persistence   of Fetal Hemoglobin. Heterozygous hemoglobin variants (HbAS, HbAC,   HbAD, HbAE, HbA2) do not significantly interfere with this assay;   however, presence of multiple variants in a sample may impact the %   interference.     07/24/2017 5.8 (H) 4.0 - 5.6 % Final     Comment:     According to ADA guidelines, hemoglobin A1c <7.0% represents  optimal control in non-pregnant diabetic patients. Different  metrics may apply to specific patient populations.   Standards of Medical Care in Diabetes-2016.  For the purpose of screening for the presence of diabetes:  <5.7%     Consistent with the absence of diabetes  5.7-6.4%  Consistent with increasing risk for diabetes   (prediabetes)  >or=6.5%  Consistent with diabetes  Currently, no consensus exists for use of hemoglobin A1c  for diagnosis of diabetes for children.  This Hemoglobin A1c assay has significant interference with fetal   hemoglobin   (HbF). The results are invalid for patients with abnormal amounts of   HbF,   including those with known Hereditary Persistence   of Fetal Hemoglobin. Heterozygous hemoglobin variants (HbAS, HbAC,   HbAD, HbAE, HbA2) do not significantly interfere with this assay;   however, presence of multiple variants in a sample may impact the %   interference.     01/19/2017 5.7 4.5 - 6.2 % Final     Comment:     According to ADA guidelines, hemoglobin A1C <7.0% represents  optimal control in non-pregnant diabetic patients.  Different  metrics may apply to specific populations.   Standards of Medical Care in Diabetes - 2016.  For the purpose of screening for the presence of diabetes:  <5.7%     Consistent with the absence of diabetes  5.7-6.4%  Consistent with increasing risk for diabetes   (prediabetes)  >or=6.5%  Consistent with diabetes  Currently no consensus exists for use of hemoglobin A1C  for diagnosis of diabetes for children.                 Patient Active Problem  List   Diagnosis    Atherosclerosis of aorta    Postablative hypothyroidism    Hyperparathyroidism       Current Outpatient Prescriptions on File Prior to Visit   Medication Sig Dispense Refill    amLODIPine (NORVASC) 10 MG tablet Take 1 tablet (10 mg total) by mouth once daily. 30 tablet 11    aspirin (ECOTRIN) 81 MG EC tablet Take 81 mg by mouth once daily.      atorvastatin (LIPITOR) 20 MG tablet Take 1 tablet (20 mg total) by mouth once daily. 90 tablet 1    CALCIUM CARBONATE (TUMS ORAL) Take 1 tablet by mouth as needed.      fexofenadine (ALLEGRA) 180 MG tablet Take 180 mg by mouth daily as needed.      levothyroxine (SYNTHROID) 112 MCG tablet TAKE ONE-HALF TABLET BY MOUTH ONCE DAILY 90 tablet 1    metFORMIN (GLUCOPHAGE) 500 MG tablet Take 1 tablet (500 mg total) by mouth 2 (two) times daily with meals. 180 tablet 1    multivitamin (ONE DAILY MULTIVITAMIN) per tablet Take 1 tablet by mouth once daily.      valsartan-hydrochlorothiazide (DIOVAN-HCT) 320-25 mg per tablet Take 1 tablet by mouth once daily. 90 tablet 0    vitamin D 1000 units Tab Take 185 mg by mouth once a week. Takes 3 vit d 1000 weekly       No current facility-administered medications on file prior to visit.        Review of patient's allergies indicates:   Allergen Reactions    Ibuprofen Rash    Neosporin [hydrocortisone] Rash     Rash and swelling       Past Surgical History:   Procedure Laterality Date    COLONOSCOPY      COLONOSCOPY N/A 9/18/2015    Procedure: COLONOSCOPY;  Surgeon: Guido Samson MD;  Location: [facility];  Service: Endoscopy;  Laterality: N/A;    HYSTERECTOMY      SPINE SURGERY  2012    lumbar       Family History   Problem Relation Age of Onset    Asthma Mother     Cancer Mother     Breast cancer Mother     Cancer Father     Hypertension Father     Stroke Father        Social History     Social History    Marital status: Single     Spouse name: N/A    Number of children: N/A    Years of  "education: N/A     Occupational History    Not on file.     Social History Main Topics    Smoking status: Former Smoker     Packs/day: 1.00     Years: 20.00     Quit date: 7/10/1986    Smokeless tobacco: Never Used    Alcohol use No    Drug use: No    Sexual activity: No     Other Topics Concern    Not on file     Social History Narrative    No narrative on file       Review of Systems   Constitution: Negative for chills, fever and weakness.   Cardiovascular: Negative for claudication and leg swelling (ankles "sometimes").   Respiratory: Negative for cough and shortness of breath.    Skin: Positive for dry skin. Negative for itching, nail changes and rash.   Musculoskeletal: Positive for arthritis, back pain, joint pain and myalgias. Negative for falls, joint swelling and muscle weakness.   Gastrointestinal: Negative for diarrhea, nausea and vomiting.   Neurological: Positive for numbness and paresthesias. Negative for tremors.   Psychiatric/Behavioral: Negative for altered mental status and hallucinations.           Objective:      Vitals:    04/10/18 0935   BP: (!) 128/56   Weight: 86.2 kg (190 lb)   Height: 5' 6" (1.676 m)   PainSc: 0-No pain     Physical Exam   Constitutional: She appears well-developed and well-nourished.  Non-toxic appearance. She does not have a sickly appearance. No distress.   alert and oriented x 3.    Cardiovascular:   Pulses:       Dorsalis pedis pulses are 2+ on the right side, and 2+ on the left side.        Posterior tibial pulses are 2+ on the right side, and 2+ on the left side.    Capillary refill time is within normal limits. Digital hair present.    Pulmonary/Chest: No respiratory distress.   Musculoskeletal: She exhibits no deformity.        Right ankle: She exhibits decreased range of motion (foot drop). No tenderness. No lateral malleolus, no medial malleolus, no AITFL, no CF ligament and no posterior TFL tenderness found. Achilles tendon exhibits no pain, no defect " and normal Rader's test results.        Left ankle: No tenderness. No lateral malleolus, no medial malleolus, no AITFL, no CF ligament and no posterior TFL tenderness found. Achilles tendon exhibits no pain, no defect and normal Rader's test results.        Right foot: There is decreased range of motion (foot drop). There is no tenderness and no bony tenderness.        Left foot: There is no tenderness and no bony tenderness.   Decreased stride, station of gait.  apropulsive toe off. High steppage gait     Patient has hammertoes of digits 2-5 bilateral partially reducible without symptom today.    Visible and palpable bunion without pain at dorsomedial 1st metatarsal head right and left.  Hallux abducted right and left partially reducible, tracks laterally without being track bound.  No ecchymosis, erythema, edema, or cardinal signs infection or signs of trauma same foot.           Feet:   Right Foot:   Protective Sensation: 5 sites tested. 1 site sensed.  Left Foot:   Protective Sensation: 5 sites tested. 5 sites sensed.   Lymphadenopathy:   No lymphatic streaking     Neurological: She displays no atrophy. No sensory deficit.   Light touch present     Skin: Skin is warm, dry and intact. No abrasion, no bruising, no burn, no ecchymosis and no rash noted. She is not diaphoretic. No cyanosis or erythema. No pallor. Nails show no clubbing.   Skin is of normal turgor.     Normal temperature gradient.     Focal hyperkeratotic lesion with painful central core consisting entirely of hyperkeratotic tissue without open skin, drainage, pus, fluctuance, malodor, or signs of infection: 4th interspace of the left foot             Psychiatric: Her mood appears not anxious. Her affect is not inappropriate. Her speech is not slurred. She is not combative. She is communicative. She is attentive.   Nursing note and vitals reviewed.        Assessment:       Encounter Diagnoses   Name Primary?    Comprehensive diabetic foot  examination, type 2 DM, encounter for Yes    Type II diabetes mellitus with neurological manifestations     Foot drop, right foot     Hammer toes of both feet     Hallux abducto valgus, left     Hallux abducto valgus, right     Corn or callus          Plan:       Madeline was seen today for diabetes mellitus, diabetic foot exam, callouses and foot problem.    Diagnoses and all orders for this visit:    Comprehensive diabetic foot examination, type 2 DM, encounter for    Type II diabetes mellitus with neurological manifestations  -     DIABETIC SHOES FOR HOME USE    Foot drop, right foot  -     DIABETIC SHOES FOR HOME USE    Hammer toes of both feet  -     DIABETIC SHOES FOR HOME USE    Hallux abducto valgus, left  -     DIABETIC SHOES FOR HOME USE    Hallux abducto valgus, right  -     DIABETIC SHOES FOR HOME USE    Corn or callus      I counseled the patient on her conditions, their implications and medical management.    Greater than 50% of this visit spent on counseling and coordination of care.    Education about the diabetic foot, neuropathy, and prevention of limb loss.    Shoe inspection. Diabetic Foot Education. Patient reminded of the importance of good nutrition and blood sugar control to help prevent podiatric complications of diabetes. Patient instructed on proper foot hygeine. We discussed wearing proper shoe gear, daily foot inspections, never walking without protective shoe gear, never putting sharp instruments to feet.      Rx diabetic shoes for protection and support    Discussed biomechanical deformity correction surgically vs conservative management      Conservative treatments for hammer digit discussed include padding, hammertoe crest  Pads, extra-depth shoes; Surgical treatments discussed, including hammertoe correction and generic post-op course. All questions answered. Patient opting to begin with conservative options first.      Patient was advised use of a pumice stone, and skin  emollients to slow the progression of callus formation.     She will continue to monitor the areas daily, inspect her  feet, wear protective shoe gear when ambulatory, moisturizer to maintain skin integrity and follow in this office in approximately 6-12 months, sooner p.r.n.           I personally performed the service described in the documentation recorded by the scribe in my presence, and it accurately and completely records my words and actions.

## 2020-01-06 NOTE — ED PROVIDER NOTE - PHYSICAL EXAMINATION
Vital Signs: I have reviewed the initial vital signs.  Constitutional: well-nourished, appears stated age, no acute distress.  HEENT: Airway patent, moist MM, no erythema/swelling/deformity of oral structures. EOMI, PERRLA.  CV: regular rate, regular rhythm, well-perfused extremities, 2+ b/l DP and radial pulses equal.  Lungs: BCTA, no increased WOB.  ABD: NTND, no guarding or rebound, no pulsatile mass, no hernias.   MSK: Neck supple, nontender, nl ROM, no stepoff. Chest nontender. Back ttp over lumbar region in perispinal region, no flank pain noted. there is mild ttp along lateral right hip, extending downward to her feet on the lateral aspect of her leg. No change in sensation vs the other leg. Strength is intact to flexion/extension of the hip/knee and dorsiflexion/plantarflexion of bilateral ankles. There is pain with hip flexion.   INTEG: Skin warm, dry, no rash.  NEURO: A&Ox3, moving all extremities, normal speech  PSYCH: Calm, cooperative, normal affect and interaction.

## 2020-01-13 NOTE — DISCHARGE NOTE OB - BREAST MILK PROVIDES PROTECTION AGAINST INFECTION
01/13/20 1400   Clinical Impression   Affect Flat   Orientation Oriented to person, place and time   Appearance and ADLs General cleanliness observed in most areas   Attention to Internal Stimuli No observed signs   Interaction Skills Interacts with prompts, minimal response   Ability to Communicate Needs Does so with prompts   Verbal Content Appropriate to topic   Ability to Maintain Boundaries Maintains appropriate physical boundaries;Maintains appropriate verbal boundaries   Participation Participates with frequent encouragement   Concentration Concentrates 10-20 minutes   Ability to Concentrate With structure   Follows and Comprehends Directions Independently follows multi-step directions   Memory Needs further assessment   Organization Independently organizes simple tasks   Decision Making Other (see comments)  (requires assist)   Planning and Problem Solving Needs assistance   Ability to Apply and Learn Concepts Applies within group structure   Frustrations / Stress Tolerance Indirect responses to frustration   Level of Insight Some insight   Self Esteem Poor self esteem   Social Supports Other (see comments)  (poor social support)   General Observation/Plan   General Observations/Plan See Comments     INITIAL O.T. ASSESSMENT:      Pt transitioned to OT group with MIN encouragement and engaged in therapeutic activity addressing functional cognition and interpersonal skills with direct VCs. With initial task set up and MOD VCs, pt participated in therapeutic group activity and discussion addressing role performance. Pt ID'd roles he currently has and responsibilities of each role (e.g. wife, ). Additionally, Pt ID'd what she does well in each role and what she could improve (e.g. share her feelings more, spend more time with her kids). Problem-solved ways to implement changes. Pt will continue to benefit from OT intervention to address implementation of positive functional coping skills, role performance,  and community reintegration.     OT assessment completed by semi-structured interview, chart review, and direct observation. Per chart, pt admitted 2/2 SI. Goals ID'd include to increase motivation, to take better care of personal health, to be more assertive, and to manage time better. OT staff explained the purpose of pt being involved in current treatment plan.      Plan: Encourage ongoing attendance as able to meet self-stated goals, implement positive coping skills, engage in therapeutic activities, and provide assessment ongoing.       Statement Selected

## 2020-03-06 NOTE — ED ADULT NURSE NOTE - OBJECTIVE STATEMENT
nonweight-bearing
Pt c/o R sided headache x 4days. Pt 15 weeks pregnant. Denies any other symptoms. able to move all extremities and follows commands

## 2020-09-24 ENCOUNTER — APPOINTMENT (OUTPATIENT)
Dept: OBGYN | Facility: CLINIC | Age: 40
End: 2020-09-24

## 2021-12-19 NOTE — OB RN PATIENT PROFILE - VISION (WITH CORRECTIVE LENSES IF THE PATIENT USUALLY WEARS THEM):
Monitor summary: SR, 72-96bpm    Measurements: .15/.08/.36           Normal vision: sees adequately in most situations; can see medication labels, newsprint

## 2022-06-08 NOTE — OB RN PATIENT PROFILE - LIVING CHILDREN, OB PROFILE
Suturegard Body: The suture ends were repeatedly re-tightened and re-clamped to achieve the desired tissue expansion. 2

## 2023-04-27 ENCOUNTER — NON-APPOINTMENT (OUTPATIENT)
Age: 43
End: 2023-04-27

## 2023-04-27 ENCOUNTER — TRANSCRIPTION ENCOUNTER (OUTPATIENT)
Age: 43
End: 2023-04-27

## 2023-04-27 ENCOUNTER — APPOINTMENT (OUTPATIENT)
Dept: OBGYN | Facility: CLINIC | Age: 43
End: 2023-04-27
Payer: MEDICAID

## 2023-04-27 DIAGNOSIS — Z01.419 ENCOUNTER FOR GYNECOLOGICAL EXAMINATION (GENERAL) (ROUTINE) W/OUT ABNORMAL FINDINGS: ICD-10-CM

## 2023-04-27 PROCEDURE — 99213 OFFICE O/P EST LOW 20 MIN: CPT | Mod: 25

## 2023-04-27 PROCEDURE — 99396 PREV VISIT EST AGE 40-64: CPT

## 2023-04-27 NOTE — HISTORY OF PRESENT ILLNESS
[FreeTextEntry1] : 43 Y/O  HERE FOR CHECK UP;PT C/O HEAVY AND PAINFUL PERIODS WITH CLOTS.\par PMHX:  xx\par PSHX; C/S X 3 \par SOCIAL:   -ETOH           -CIGG\par STD:                                      DISCUSSED CONDOMSx\par FAMILY HX OF BREAST CANCER   OVARIAN   UTERINE CA: x\par REVIEW OF SYMPTOMS DONE\par ALLERGIES:     Patient has answered NKDA\par Medication reconciliation was completed by reviewing, with the patient’s involvement, the patient’s current outpatient medications and those ordered for the patient today. \par \par PE: BREASTS;- MASSES DC NODES SBE\par ABD SOFT NT ND- \par NL GENITALIA\par VAGINA -DC\par CX-CMT\par UTERUS MILD TENDER  TOP NL SZIE NT\par ADNEXA NT - MASSES\par EXT -CCE\par \par A;P;CHECK UP, HEAVYAND PAINFUL PERIODS\par -PAP GC CHLAMYDIA\par -ANITA\par -SONO\par -HYSTEROPSCOPY\par -F-U AFTER ABOVE.\par

## 2023-04-30 LAB
C TRACH RRNA SPEC QL NAA+PROBE: NOT DETECTED
HPV HIGH+LOW RISK DNA PNL CVX: NOT DETECTED
N GONORRHOEA RRNA SPEC QL NAA+PROBE: NOT DETECTED
SOURCE AMPLIFICATION: NORMAL

## 2023-05-03 ENCOUNTER — RESULT REVIEW (OUTPATIENT)
Age: 43
End: 2023-05-03

## 2023-05-03 ENCOUNTER — OUTPATIENT (OUTPATIENT)
Dept: OUTPATIENT SERVICES | Facility: HOSPITAL | Age: 43
LOS: 1 days | End: 2023-05-03
Payer: MEDICAID

## 2023-05-03 DIAGNOSIS — Z98.890 OTHER SPECIFIED POSTPROCEDURAL STATES: Chronic | ICD-10-CM

## 2023-05-03 DIAGNOSIS — Z12.31 ENCOUNTER FOR SCREENING MAMMOGRAM FOR MALIGNANT NEOPLASM OF BREAST: ICD-10-CM

## 2023-05-03 DIAGNOSIS — Z98.891 HISTORY OF UTERINE SCAR FROM PREVIOUS SURGERY: Chronic | ICD-10-CM

## 2023-05-03 LAB — CYTOLOGY CVX/VAG DOC THIN PREP: ABNORMAL

## 2023-05-03 PROCEDURE — 77063 BREAST TOMOSYNTHESIS BI: CPT

## 2023-05-03 PROCEDURE — 77067 SCR MAMMO BI INCL CAD: CPT

## 2023-05-03 PROCEDURE — 77063 BREAST TOMOSYNTHESIS BI: CPT | Mod: 26

## 2023-05-03 PROCEDURE — 77067 SCR MAMMO BI INCL CAD: CPT | Mod: 26

## 2023-05-04 ENCOUNTER — APPOINTMENT (OUTPATIENT)
Dept: OBGYN | Facility: CLINIC | Age: 43
End: 2023-05-04
Payer: MEDICAID

## 2023-05-04 ENCOUNTER — NON-APPOINTMENT (OUTPATIENT)
Age: 43
End: 2023-05-04

## 2023-05-04 DIAGNOSIS — Z12.31 ENCOUNTER FOR SCREENING MAMMOGRAM FOR MALIGNANT NEOPLASM OF BREAST: ICD-10-CM

## 2023-05-04 DIAGNOSIS — N89.8 OTHER SPECIFIED NONINFLAMMATORY DISORDERS OF VAGINA: ICD-10-CM

## 2023-05-04 PROCEDURE — 99214 OFFICE O/P EST MOD 30 MIN: CPT

## 2023-05-04 RX ORDER — METRONIDAZOLE 500 MG/1
500 TABLET ORAL
Qty: 14 | Refills: 0 | Status: ACTIVE | COMMUNITY
Start: 2023-05-04 | End: 1900-01-01

## 2023-05-04 NOTE — HISTORY OF PRESENT ILLNESS
[FreeTextEntry1] : 41 Y/O  HERE C/O VAGINAL DC AND TRICH SEEN ON PAP SMEAR.\par PT C/O HEAVY AND PAINFUL PERIODS WITH CLOTS.\par PMHX:  xx\par PSHX; C/S X 3 \par SOCIAL:   -ETOH           -CIGG\par STD:                                      DISCUSSED CONDOMSx\par FAMILY HX OF BREAST CANCER   OVARIAN   UTERINE CA: x\par REVIEW OF SYMPTOMS DONE\par ALLERGIES:     Patient has answered NKDA\par Medication reconciliation was completed by reviewing, with the patient’s involvement, the patient’s current outpatient medications and those ordered for the patient today. \par \par PE: \par ABD SOFT NT ND- \par EXT -CCE\par \par A;P;TRICH\par -FLAGYL 500 BID X 7 DAYS\par -DISCUSSED IT WITH PT AND HER PARTNER;COUNSELLED BOTH\par -F-U PRN.\par

## 2023-05-15 ENCOUNTER — APPOINTMENT (OUTPATIENT)
Dept: OBGYN | Facility: CLINIC | Age: 43
End: 2023-05-15

## 2023-05-23 ENCOUNTER — APPOINTMENT (OUTPATIENT)
Dept: OBGYN | Facility: CLINIC | Age: 43
End: 2023-05-23
Payer: MEDICAID

## 2023-05-23 DIAGNOSIS — N92.0 EXCESSIVE AND FREQUENT MENSTRUATION WITH REGULAR CYCLE: ICD-10-CM

## 2023-05-23 PROCEDURE — 76830 TRANSVAGINAL US NON-OB: CPT

## 2023-05-23 PROCEDURE — 99213 OFFICE O/P EST LOW 20 MIN: CPT

## 2023-05-23 NOTE — HISTORY OF PRESENT ILLNESS
[FreeTextEntry1] : 41 Y/O  HERE C/O HEAVY AND PAINFUL PERIODS WITH CLOTS.\par PMHX:  xx\par PSHX; C/S X 3 \par SOCIAL:   -ETOH           -CIGG\par STD:                                      DISCUSSED CONDOMSx\par FAMILY HX OF BREAST CANCER   OVARIAN   UTERINE CA: x\par REVIEW OF SYMPTOMS DONE\par ALLERGIES:     Patient has answered NKDA\par Medication reconciliation was completed by reviewing, with the patient’s involvement, the patient’s current outpatient medications and those ordered for the patient today. \par \par PE: \par ABD SOFT NT ND- \par EXT -CCE\par \par A;P;HEAVY PAINFUL PERIODS\par -SONO REVIEWED\par -HYSTEROSCOPY APPT\par -ANSWERED QUESTIONS.\par \par

## 2023-06-01 ENCOUNTER — APPOINTMENT (OUTPATIENT)
Dept: OBGYN | Facility: CLINIC | Age: 43
End: 2023-06-01

## 2023-09-21 NOTE — OB RN PREOPERATIVE CHECKLIST - NS_PREOPBLOODCONS_OBGYN_ALL_OB
[facility] CALLING TO SCHEDULE HOSPITAL FOLLOW UP WITH ROD SUN.    HUB CANNOT SCHEDULE WITHIN REQUIRED TIMEFRAME.    PLEASE CONTACT PATIENT TO SCHEDULE    THANK YOU   n/a

## 2024-05-16 ENCOUNTER — APPOINTMENT (OUTPATIENT)
Dept: OBGYN | Facility: CLINIC | Age: 44
End: 2024-05-16

## 2024-06-07 ENCOUNTER — OUTPATIENT (OUTPATIENT)
Dept: OUTPATIENT SERVICES | Facility: HOSPITAL | Age: 44
LOS: 1 days | End: 2024-06-07
Payer: MEDICAID

## 2024-06-07 DIAGNOSIS — Z12.31 ENCOUNTER FOR SCREENING MAMMOGRAM FOR MALIGNANT NEOPLASM OF BREAST: ICD-10-CM

## 2024-06-07 DIAGNOSIS — Z98.890 OTHER SPECIFIED POSTPROCEDURAL STATES: Chronic | ICD-10-CM

## 2024-06-07 DIAGNOSIS — Z98.891 HISTORY OF UTERINE SCAR FROM PREVIOUS SURGERY: Chronic | ICD-10-CM

## 2024-06-07 PROCEDURE — 77067 SCR MAMMO BI INCL CAD: CPT

## 2024-06-07 PROCEDURE — 77063 BREAST TOMOSYNTHESIS BI: CPT

## 2024-06-07 PROCEDURE — 77063 BREAST TOMOSYNTHESIS BI: CPT | Mod: 26

## 2024-06-07 PROCEDURE — 77067 SCR MAMMO BI INCL CAD: CPT | Mod: 26

## 2024-06-08 DIAGNOSIS — Z12.31 ENCOUNTER FOR SCREENING MAMMOGRAM FOR MALIGNANT NEOPLASM OF BREAST: ICD-10-CM

## 2024-06-13 ENCOUNTER — OUTPATIENT (OUTPATIENT)
Dept: OUTPATIENT SERVICES | Facility: HOSPITAL | Age: 44
LOS: 1 days | End: 2024-06-13
Payer: MEDICAID

## 2024-06-13 DIAGNOSIS — R92.8 OTHER ABNORMAL AND INCONCLUSIVE FINDINGS ON DIAGNOSTIC IMAGING OF BREAST: ICD-10-CM

## 2024-06-13 DIAGNOSIS — Z98.890 OTHER SPECIFIED POSTPROCEDURAL STATES: Chronic | ICD-10-CM

## 2024-06-13 DIAGNOSIS — Z98.891 HISTORY OF UTERINE SCAR FROM PREVIOUS SURGERY: Chronic | ICD-10-CM

## 2024-06-13 PROCEDURE — 77065 DX MAMMO INCL CAD UNI: CPT | Mod: 26,RT

## 2024-06-13 PROCEDURE — G0279: CPT

## 2024-06-13 PROCEDURE — G0279: CPT | Mod: 26

## 2024-06-13 PROCEDURE — 76642 ULTRASOUND BREAST LIMITED: CPT | Mod: 26,RT

## 2024-06-13 PROCEDURE — 77065 DX MAMMO INCL CAD UNI: CPT | Mod: RT

## 2024-06-13 PROCEDURE — 76642 ULTRASOUND BREAST LIMITED: CPT | Mod: RT

## 2024-06-14 DIAGNOSIS — R92.8 OTHER ABNORMAL AND INCONCLUSIVE FINDINGS ON DIAGNOSTIC IMAGING OF BREAST: ICD-10-CM

## 2024-12-18 ENCOUNTER — NON-APPOINTMENT (OUTPATIENT)
Age: 44
End: 2024-12-18